# Patient Record
Sex: FEMALE | Race: OTHER | Employment: PART TIME | URBAN - METROPOLITAN AREA
[De-identification: names, ages, dates, MRNs, and addresses within clinical notes are randomized per-mention and may not be internally consistent; named-entity substitution may affect disease eponyms.]

---

## 2017-01-12 ENCOUNTER — OFFICE VISIT (OUTPATIENT)
Dept: FAMILY MEDICINE CLINIC | Facility: CLINIC | Age: 62
End: 2017-01-12

## 2017-01-12 VITALS
SYSTOLIC BLOOD PRESSURE: 118 MMHG | HEART RATE: 92 BPM | DIASTOLIC BLOOD PRESSURE: 78 MMHG | BODY MASS INDEX: 26.5 KG/M2 | WEIGHT: 135 LBS | TEMPERATURE: 100 F | HEIGHT: 60 IN | RESPIRATION RATE: 16 BRPM

## 2017-01-12 DIAGNOSIS — R68.89 FLU-LIKE SYMPTOMS: Primary | ICD-10-CM

## 2017-01-12 PROCEDURE — 99213 OFFICE O/P EST LOW 20 MIN: CPT | Performed by: FAMILY MEDICINE

## 2017-01-12 RX ORDER — OSELTAMIVIR PHOSPHATE 75 MG/1
75 CAPSULE ORAL 2 TIMES DAILY
Qty: 10 CAPSULE | Refills: 0 | Status: SHIPPED | OUTPATIENT
Start: 2017-01-12 | End: 2017-02-06 | Stop reason: ALTCHOICE

## 2017-01-12 NOTE — PROGRESS NOTES
Chief Complaint:  Patient presents with:  Fatigue: Fatigue for 2days. Headache: Headache above eyes for 2 days. Runny nose. Took Advil with some relief. Pt had been advised not to take Advil. Fever: Temperature of 103 yesterday.     HPI:  This is a 62 year Ophthalmic Solution 1 drop 2 (two) times daily. Disp:  Rfl:    latanoprost 0.005 % Ophthalmic Solution nightly.  Disp:  Rfl:    Levothyroxine Sodium 88 MCG Oral Tab Take 1 tablet (88 mcg total) by mouth before breakfast. Disp: 30 tablet Rfl: 5   Atorvastati hours of symptoms, will treat with tamiflu. Discussed symptomatic management. -     Oseltamivir Phosphate 75 MG Oral Cap; Take 1 capsule (75 mg total) by mouth 2 (two) times daily.     Concerning signs and symptoms that warrant returning to the clinic rev

## 2017-01-23 DIAGNOSIS — E78.00 PURE HYPERCHOLESTEROLEMIA: Primary | ICD-10-CM

## 2017-01-23 RX ORDER — ATORVASTATIN CALCIUM 10 MG/1
TABLET, FILM COATED ORAL
Qty: 30 TABLET | Refills: 0 | Status: SHIPPED | OUTPATIENT
Start: 2017-01-23 | End: 2017-02-06

## 2017-01-24 ENCOUNTER — OFFICE VISIT (OUTPATIENT)
Dept: INTERNAL MEDICINE CLINIC | Facility: CLINIC | Age: 62
End: 2017-01-24

## 2017-01-24 VITALS
RESPIRATION RATE: 16 BRPM | DIASTOLIC BLOOD PRESSURE: 60 MMHG | BODY MASS INDEX: 27.21 KG/M2 | HEIGHT: 59 IN | WEIGHT: 135 LBS | HEART RATE: 78 BPM | SYSTOLIC BLOOD PRESSURE: 122 MMHG

## 2017-01-24 DIAGNOSIS — Z51.81 ENCOUNTER FOR THERAPEUTIC DRUG MONITORING: Primary | ICD-10-CM

## 2017-01-24 DIAGNOSIS — F43.9 STRESS: ICD-10-CM

## 2017-01-24 DIAGNOSIS — E66.9 OBESITY (BMI 30.0-34.9): ICD-10-CM

## 2017-01-24 PROCEDURE — 99213 OFFICE O/P EST LOW 20 MIN: CPT | Performed by: INTERNAL MEDICINE

## 2017-01-24 NOTE — PROGRESS NOTES
CC: Patient presents with:  Weight Check: up 2 lb       HPI:   Obesity, doing well on belviq and wellbutrin. No vision issues. Stress level is ok.        Current Outpatient Prescriptions:  Lorcaserin HCl (BELVIQ) 10 MG Oral Tab Take 10 mg by mouth 2 (tw and in no apparent distress, A/O x3  HEENT:  PERRLA  LUNGS: clear to auscultation bilat   CARDIO: RRR without murmur    No orders of the defined types were placed in this encounter.         Signed Prescriptions Disp Refills    Lorcaserin HCl (BELVIQ) 10 MG

## 2017-01-24 NOTE — TELEPHONE ENCOUNTER
Last physical was 1/26/16. Future Appointments  Date Time Provider Nils Guzman   1/24/2017 1:30 PM Mely Oviedo MD [facility]     Medication refilled per protocol.   Future Appointments  Date Time Provider Nils Guzman   1/24/2017 1:30

## 2017-01-30 ENCOUNTER — TELEPHONE (OUTPATIENT)
Dept: FAMILY MEDICINE CLINIC | Facility: CLINIC | Age: 62
End: 2017-01-30

## 2017-01-30 DIAGNOSIS — Z13.0 SCREENING FOR DEFICIENCY ANEMIA: ICD-10-CM

## 2017-01-30 DIAGNOSIS — Z13.228 SCREENING FOR METABOLIC DISORDER: ICD-10-CM

## 2017-01-30 DIAGNOSIS — E03.9 ACQUIRED HYPOTHYROIDISM: ICD-10-CM

## 2017-01-30 DIAGNOSIS — Z00.00 ROUTINE GENERAL MEDICAL EXAMINATION AT A HEALTH CARE FACILITY: Primary | ICD-10-CM

## 2017-01-30 DIAGNOSIS — E78.00 PURE HYPERCHOLESTEROLEMIA: ICD-10-CM

## 2017-01-30 NOTE — TELEPHONE ENCOUNTER
MELANY garrett/yoel on 2/6/17. Sanchobinh Fredrick no changes in health and also has an appt here for her labs on 2/1/17. Please place labs

## 2017-01-31 ENCOUNTER — TELEPHONE (OUTPATIENT)
Dept: FAMILY MEDICINE CLINIC | Facility: CLINIC | Age: 62
End: 2017-01-31

## 2017-02-01 ENCOUNTER — LAB ENCOUNTER (OUTPATIENT)
Dept: LAB | Age: 62
End: 2017-02-01
Attending: FAMILY MEDICINE
Payer: MEDICAID

## 2017-02-01 DIAGNOSIS — E78.00 PURE HYPERCHOLESTEROLEMIA: ICD-10-CM

## 2017-02-01 DIAGNOSIS — Z00.00 ROUTINE GENERAL MEDICAL EXAMINATION AT A HEALTH CARE FACILITY: ICD-10-CM

## 2017-02-01 DIAGNOSIS — Z13.0 SCREENING FOR DEFICIENCY ANEMIA: ICD-10-CM

## 2017-02-01 DIAGNOSIS — Z13.228 SCREENING FOR METABOLIC DISORDER: ICD-10-CM

## 2017-02-01 DIAGNOSIS — E03.9 ACQUIRED HYPOTHYROIDISM: ICD-10-CM

## 2017-02-01 LAB
25-HYDROXYVITAMIN D (TOTAL): 25.7 NG/ML (ref 30–100)
ALBUMIN SERPL-MCNC: 3.5 G/DL (ref 3.5–4.8)
ALP LIVER SERPL-CCNC: 81 U/L (ref 46–118)
ALT SERPL-CCNC: 29 U/L (ref 14–54)
AST SERPL-CCNC: 15 U/L (ref 15–41)
BASOPHILS # BLD AUTO: 0.01 X10(3) UL (ref 0–0.1)
BASOPHILS NFR BLD AUTO: 0.3 %
BILIRUB SERPL-MCNC: 0.6 MG/DL (ref 0.1–2)
BUN BLD-MCNC: 16 MG/DL (ref 8–20)
CALCIUM BLD-MCNC: 8.9 MG/DL (ref 8.3–10.3)
CHLORIDE: 108 MMOL/L (ref 101–111)
CHOLEST SMN-MCNC: 192 MG/DL (ref ?–200)
CO2: 27 MMOL/L (ref 22–32)
CREAT BLD-MCNC: 1.01 MG/DL (ref 0.55–1.02)
EOSINOPHIL # BLD AUTO: 0.04 X10(3) UL (ref 0–0.3)
EOSINOPHIL NFR BLD AUTO: 1.1 %
ERYTHROCYTE [DISTWIDTH] IN BLOOD BY AUTOMATED COUNT: 14.6 % (ref 11.5–16)
FREE T4: 1 NG/DL (ref 0.9–1.8)
GLUCOSE BLD-MCNC: 82 MG/DL (ref 70–99)
HCT VFR BLD AUTO: 42.4 % (ref 34–50)
HDLC SERPL-MCNC: 85 MG/DL (ref 45–?)
HDLC SERPL: 2.26 {RATIO} (ref ?–4.44)
HGB BLD-MCNC: 13.8 G/DL (ref 12–16)
IMMATURE GRANULOCYTE COUNT: 0 X10(3) UL (ref 0–1)
IMMATURE GRANULOCYTE RATIO %: 0 %
LDLC SERPL CALC-MCNC: 94 MG/DL (ref ?–130)
LYMPHOCYTES # BLD AUTO: 2.08 X10(3) UL (ref 0.9–4)
LYMPHOCYTES NFR BLD AUTO: 57 %
M PROTEIN MFR SERPL ELPH: 7.3 G/DL (ref 6.1–8.3)
MCH RBC QN AUTO: 30.9 PG (ref 27–33.2)
MCHC RBC AUTO-ENTMCNC: 32.5 G/DL (ref 31–37)
MCV RBC AUTO: 94.9 FL (ref 81–100)
MONOCYTES # BLD AUTO: 0.39 X10(3) UL (ref 0.1–0.6)
MONOCYTES NFR BLD AUTO: 10.7 %
NEUTROPHIL ABS PRELIM: 1.13 X10 (3) UL (ref 1.3–6.7)
NEUTROPHILS # BLD AUTO: 1.13 X10(3) UL (ref 1.3–6.7)
NEUTROPHILS NFR BLD AUTO: 30.9 %
NONHDLC SERPL-MCNC: 107 MG/DL (ref ?–130)
PLATELET # BLD AUTO: 245 10(3)UL (ref 150–450)
POTASSIUM SERPL-SCNC: 3.8 MMOL/L (ref 3.6–5.1)
RBC # BLD AUTO: 4.47 X10(6)UL (ref 3.8–5.1)
RED CELL DISTRIBUTION WIDTH-SD: 50.8 FL (ref 35.1–46.3)
SODIUM SERPL-SCNC: 141 MMOL/L (ref 136–144)
TRIGLYCERIDES: 67 MG/DL (ref ?–150)
TSI SER-ACNC: 1.24 MIU/ML (ref 0.35–5.5)
VLDL: 13 MG/DL (ref 5–40)
WBC # BLD AUTO: 3.7 X10(3) UL (ref 4–13)

## 2017-02-01 PROCEDURE — 85025 COMPLETE CBC W/AUTO DIFF WBC: CPT

## 2017-02-01 PROCEDURE — 80053 COMPREHEN METABOLIC PANEL: CPT

## 2017-02-01 PROCEDURE — 36415 COLL VENOUS BLD VENIPUNCTURE: CPT

## 2017-02-01 PROCEDURE — 84439 ASSAY OF FREE THYROXINE: CPT

## 2017-02-01 PROCEDURE — 80061 LIPID PANEL: CPT

## 2017-02-01 PROCEDURE — 84443 ASSAY THYROID STIM HORMONE: CPT

## 2017-02-01 PROCEDURE — 82306 VITAMIN D 25 HYDROXY: CPT

## 2017-02-01 NOTE — TELEPHONE ENCOUNTER
Called pt and left msg, stating that belviq is not covered under her pharmacy benefit plan, and to use the coupon that was provided, if she did not receive to have her call the office.

## 2017-02-06 ENCOUNTER — OFFICE VISIT (OUTPATIENT)
Dept: FAMILY MEDICINE CLINIC | Facility: CLINIC | Age: 62
End: 2017-02-06

## 2017-02-06 VITALS
HEART RATE: 72 BPM | HEIGHT: 60.25 IN | RESPIRATION RATE: 12 BRPM | SYSTOLIC BLOOD PRESSURE: 130 MMHG | WEIGHT: 141 LBS | BODY MASS INDEX: 27.32 KG/M2 | DIASTOLIC BLOOD PRESSURE: 72 MMHG

## 2017-02-06 DIAGNOSIS — Z12.31 ENCOUNTER FOR SCREENING MAMMOGRAM FOR BREAST CANCER: ICD-10-CM

## 2017-02-06 DIAGNOSIS — E78.00 PURE HYPERCHOLESTEROLEMIA: ICD-10-CM

## 2017-02-06 DIAGNOSIS — Z01.419 WELL WOMAN EXAM: Primary | ICD-10-CM

## 2017-02-06 DIAGNOSIS — E03.9 ACQUIRED HYPOTHYROIDISM: ICD-10-CM

## 2017-02-06 DIAGNOSIS — E04.2 MULTIPLE THYROID NODULES: ICD-10-CM

## 2017-02-06 PROCEDURE — 99396 PREV VISIT EST AGE 40-64: CPT | Performed by: FAMILY MEDICINE

## 2017-02-06 RX ORDER — ATORVASTATIN CALCIUM 10 MG/1
TABLET, FILM COATED ORAL
Qty: 90 TABLET | Refills: 3 | Status: SHIPPED | OUTPATIENT
Start: 2017-02-06 | End: 2018-02-09

## 2017-02-06 RX ORDER — LEVOTHYROXINE SODIUM 88 UG/1
88 TABLET ORAL
Qty: 90 TABLET | Refills: 3 | Status: SHIPPED | OUTPATIENT
Start: 2017-02-06 | End: 2018-02-09

## 2017-02-06 NOTE — PROGRESS NOTES
:HPI:   Robert Morel is a 65year old female who presents for a complete physical exam.     Patient complains of fatigue. Working with the elderly.   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 Ultrasound of thyroid is ordered. Further plans pending the outcome of this. Patient does not think she can see Dr. Maddie Saleem anymore due to her insurance. Encouraged her to get back to her regular aerobic activity.     Se

## 2017-02-15 ENCOUNTER — HOSPITAL ENCOUNTER (OUTPATIENT)
Dept: MAMMOGRAPHY | Age: 62
Discharge: HOME | End: 2017-02-15
Attending: FAMILY MEDICINE
Payer: MEDICAID

## 2017-02-15 ENCOUNTER — HOSPITAL ENCOUNTER (OUTPATIENT)
Dept: ULTRASOUND IMAGING | Age: 62
Discharge: HOME | End: 2017-02-15
Attending: FAMILY MEDICINE
Payer: MEDICAID

## 2017-02-15 DIAGNOSIS — Z12.31 ENCOUNTER FOR SCREENING MAMMOGRAM FOR BREAST CANCER: ICD-10-CM

## 2017-02-15 DIAGNOSIS — E04.2 MULTIPLE THYROID NODULES: ICD-10-CM

## 2017-02-15 PROCEDURE — 77067 SCR MAMMO BI INCL CAD: CPT

## 2017-02-15 PROCEDURE — 76536 US EXAM OF HEAD AND NECK: CPT

## 2017-03-01 ENCOUNTER — TELEPHONE (OUTPATIENT)
Dept: FAMILY MEDICINE CLINIC | Facility: CLINIC | Age: 62
End: 2017-03-01

## 2017-03-01 DIAGNOSIS — E04.2 MULTIPLE THYROID NODULES: Primary | ICD-10-CM

## 2017-03-01 NOTE — TELEPHONE ENCOUNTER
Pt states that she called Medicaid and they had the names of two ENT's that she could see. When she called, one is no longer practicing and the other is in [facility]. Offered to enter referral to ENT in [facility].  Pt states that she does not want to go to [facility]

## 2017-03-01 NOTE — TELEPHONE ENCOUNTER
Patient needs recommendations to an ears, nose, and eye specialist due to her having nodules on her thyroid.

## 2017-03-01 NOTE — TELEPHONE ENCOUNTER
Pt returned call with [facility] ENT Dr. Jose Rafael [facility]

## 2017-03-02 ENCOUNTER — TELEPHONE (OUTPATIENT)
Dept: INTERNAL MEDICINE CLINIC | Facility: CLINIC | Age: 62
End: 2017-03-02

## 2017-03-14 ENCOUNTER — OFFICE VISIT (OUTPATIENT)
Dept: OTOLARYNGOLOGY | Facility: CLINIC | Age: 62
End: 2017-03-14

## 2017-03-14 VITALS
DIASTOLIC BLOOD PRESSURE: 60 MMHG | WEIGHT: 136 LBS | SYSTOLIC BLOOD PRESSURE: 112 MMHG | BODY MASS INDEX: 27.42 KG/M2 | HEIGHT: 59 IN | TEMPERATURE: 98 F

## 2017-03-14 DIAGNOSIS — J34.89 NASAL SEPTAL PERFORATION: Primary | ICD-10-CM

## 2017-03-14 DIAGNOSIS — E04.2 MULTINODULAR NON-TOXIC GOITER: ICD-10-CM

## 2017-03-14 PROCEDURE — 99212 OFFICE O/P EST SF 10 MIN: CPT | Performed by: OTOLARYNGOLOGY

## 2017-03-14 PROCEDURE — 99243 OFF/OP CNSLTJ NEW/EST LOW 30: CPT | Performed by: OTOLARYNGOLOGY

## 2017-03-14 RX ORDER — BROMPHENIRAMIN/PSEUDOEPHEDRINE 12MG-120MG
1 CAPSULE, EXTENDED RELEASE ORAL 2 TIMES DAILY
COMMUNITY
Start: 2017-03-01 | End: 2019-12-16

## 2017-03-14 NOTE — PROGRESS NOTES
Minnie Simmonds is a 65year old female. Patient presents with:  Thyroid Problem: thyroid ultrasound done on 2/15/17      HISTORY OF PRESENT ILLNESS    She presents with a history of hypothyroidism currently on levothyroxine with stable TSH levels.  She oliver Negative Dyspnea and wheezing. Cardio Negative Chest pain, irregular heartbeat/palpitations and syncope. GI Negative Abdominal pain and diarrhea. Endocrine Negative Cold intolerance and heat intolerance. Neuro Negative Tremors.    Psych Negative Anx TIME A DAY, Disp: 90 tablet, Rfl: 3  •  Levothyroxine Sodium 88 MCG Oral Tab, Take 1 tablet (88 mcg total) by mouth before breakfast., Disp: 90 tablet, Rfl: 3  •  Lorcaserin HCl (BELVIQ) 10 MG Oral Tab, Take 10 mg by mouth 2 (two) times daily. , Disp: 60 ta

## 2017-03-20 ENCOUNTER — TELEPHONE (OUTPATIENT)
Dept: OTOLARYNGOLOGY | Facility: CLINIC | Age: 62
End: 2017-03-20

## 2017-03-20 NOTE — TELEPHONE ENCOUNTER
Message  Received: 6 days ago       Negrito Leggett MD  [facility] Clinical Staff                     Repeat ultrasound of the thyroid in one year.  Place her name in our thyroid book.

## 2017-04-25 ENCOUNTER — OFFICE VISIT (OUTPATIENT)
Dept: INTERNAL MEDICINE CLINIC | Facility: CLINIC | Age: 62
End: 2017-04-25

## 2017-04-25 VITALS
BODY MASS INDEX: 26.41 KG/M2 | SYSTOLIC BLOOD PRESSURE: 118 MMHG | WEIGHT: 131 LBS | HEART RATE: 68 BPM | DIASTOLIC BLOOD PRESSURE: 70 MMHG | HEIGHT: 59 IN | RESPIRATION RATE: 16 BRPM

## 2017-04-25 DIAGNOSIS — F43.9 STRESS: ICD-10-CM

## 2017-04-25 DIAGNOSIS — Z51.81 ENCOUNTER FOR THERAPEUTIC DRUG MONITORING: Primary | ICD-10-CM

## 2017-04-25 DIAGNOSIS — E66.9 OBESITY (BMI 30.0-34.9): ICD-10-CM

## 2017-04-25 PROCEDURE — 99213 OFFICE O/P EST LOW 20 MIN: CPT | Performed by: INTERNAL MEDICINE

## 2017-04-25 RX ORDER — BUPROPION HYDROCHLORIDE 300 MG/1
300 TABLET ORAL DAILY
Qty: 30 TABLET | Refills: 5 | Status: SHIPPED | OUTPATIENT
Start: 2017-04-25 | End: 2017-09-08

## 2017-04-25 NOTE — PROGRESS NOTES
CC: Patient presents with:  Weight Check: down 4 lb       HPI:   Obesity, doing well on belviq and wellbutrin. No vision problems. Some increased stress taking care of  and parents.        Current Outpatient Prescriptions:  Lorcaserin HCl (BELV PERRLA  LUNGS: clear to auscultation bilat   CARDIO: RRR without murmur    No orders of the defined types were placed in this encounter.         Signed Prescriptions Disp Refills    Lorcaserin HCl (BELVIQ) 10 MG Oral Tab 60 tablet 2      Sig: Take 10 mg by

## 2017-08-08 ENCOUNTER — OFFICE VISIT (OUTPATIENT)
Dept: INTERNAL MEDICINE CLINIC | Facility: CLINIC | Age: 62
End: 2017-08-08

## 2017-08-08 VITALS
DIASTOLIC BLOOD PRESSURE: 78 MMHG | RESPIRATION RATE: 16 BRPM | SYSTOLIC BLOOD PRESSURE: 120 MMHG | WEIGHT: 128 LBS | HEIGHT: 59 IN | BODY MASS INDEX: 25.8 KG/M2 | HEART RATE: 76 BPM

## 2017-08-08 DIAGNOSIS — F43.9 STRESS: ICD-10-CM

## 2017-08-08 DIAGNOSIS — E66.9 OBESITY (BMI 30.0-34.9): ICD-10-CM

## 2017-08-08 DIAGNOSIS — Z51.81 ENCOUNTER FOR THERAPEUTIC DRUG MONITORING: Primary | ICD-10-CM

## 2017-08-08 PROCEDURE — 99213 OFFICE O/P EST LOW 20 MIN: CPT | Performed by: INTERNAL MEDICINE

## 2017-08-08 NOTE — PROGRESS NOTES
CC: Patient presents with:  Weight Check: down 3 lb       HPI:   Obesity, doing well on belviq and wellbutrin. Working out well. Stress level is ok. No vision problems.        Current Outpatient Prescriptions:  Lorcaserin HCl (BELVIQ) 10 MG Oral Tab T PERRLA  LUNGS: clear to auscultation bilat   CARDIO: RRR without murmur      Orders Placed This Encounter      Vitamin B12      VITAMIN D, 25-HYDROXY     Signed Prescriptions Disp Refills    Lorcaserin HCl (BELVIQ) 10 MG Oral Tab 60 tablet 2      Sig: Take

## 2017-08-10 ENCOUNTER — LAB ENCOUNTER (OUTPATIENT)
Dept: LAB | Age: 62
End: 2017-08-10
Attending: INTERNAL MEDICINE
Payer: COMMERCIAL

## 2017-08-10 DIAGNOSIS — Z51.81 ENCOUNTER FOR THERAPEUTIC DRUG MONITORING: ICD-10-CM

## 2017-08-10 DIAGNOSIS — E66.9 OBESITY (BMI 30.0-34.9): ICD-10-CM

## 2017-08-10 LAB
25-HYDROXYVITAMIN D (TOTAL): 33.3 NG/ML (ref 30–100)
HAV AB SERPL IA-ACNC: 1082 PG/ML (ref 193–986)

## 2017-08-10 PROCEDURE — 36415 COLL VENOUS BLD VENIPUNCTURE: CPT | Performed by: INTERNAL MEDICINE

## 2017-08-10 PROCEDURE — 82306 VITAMIN D 25 HYDROXY: CPT | Performed by: INTERNAL MEDICINE

## 2017-08-10 PROCEDURE — 82607 VITAMIN B-12: CPT | Performed by: INTERNAL MEDICINE

## 2017-08-24 ENCOUNTER — TELEPHONE (OUTPATIENT)
Dept: INTERNAL MEDICINE CLINIC | Facility: CLINIC | Age: 62
End: 2017-08-24

## 2017-09-08 ENCOUNTER — OFFICE VISIT (OUTPATIENT)
Dept: INTERNAL MEDICINE CLINIC | Facility: CLINIC | Age: 62
End: 2017-09-08

## 2017-09-08 ENCOUNTER — TELEPHONE (OUTPATIENT)
Dept: INTERNAL MEDICINE CLINIC | Facility: CLINIC | Age: 62
End: 2017-09-08

## 2017-09-08 DIAGNOSIS — Z02.9 ENCOUNTERS FOR ADMINISTRATIVE PURPOSE: Primary | ICD-10-CM

## 2017-09-08 RX ORDER — BUPROPION HYDROCHLORIDE 300 MG/1
300 TABLET ORAL DAILY
Qty: 30 TABLET | Refills: 5 | Status: CANCELLED | OUTPATIENT
Start: 2017-09-08

## 2017-09-08 RX ORDER — BUPROPION HYDROCHLORIDE 300 MG/1
300 TABLET ORAL DAILY
Qty: 30 TABLET | Refills: 5 | Status: SHIPPED | OUTPATIENT
Start: 2017-09-08 | End: 2018-01-26

## 2017-09-14 ENCOUNTER — TELEPHONE (OUTPATIENT)
Dept: INTERNAL MEDICINE CLINIC | Facility: CLINIC | Age: 62
End: 2017-09-14

## 2017-09-14 ENCOUNTER — TELEPHONE (OUTPATIENT)
Dept: FAMILY MEDICINE CLINIC | Facility: CLINIC | Age: 62
End: 2017-09-14

## 2017-09-14 DIAGNOSIS — E78.00 PURE HYPERCHOLESTEROLEMIA: Primary | ICD-10-CM

## 2017-09-15 ENCOUNTER — APPOINTMENT (OUTPATIENT)
Dept: LAB | Age: 62
End: 2017-09-15
Attending: FAMILY MEDICINE
Payer: COMMERCIAL

## 2017-09-15 DIAGNOSIS — E78.00 PURE HYPERCHOLESTEROLEMIA: ICD-10-CM

## 2017-09-15 LAB
ALBUMIN SERPL-MCNC: 3.7 G/DL (ref 3.5–4.8)
ALP LIVER SERPL-CCNC: 85 U/L (ref 50–130)
ALT SERPL-CCNC: 42 U/L (ref 14–54)
AST SERPL-CCNC: 26 U/L (ref 15–41)
BILIRUB SERPL-MCNC: 0.5 MG/DL (ref 0.1–2)
BUN BLD-MCNC: 20 MG/DL (ref 8–20)
CALCIUM BLD-MCNC: 8.6 MG/DL (ref 8.3–10.3)
CHLORIDE: 108 MMOL/L (ref 101–111)
CHOLEST SMN-MCNC: 186 MG/DL (ref ?–200)
CO2: 28 MMOL/L (ref 22–32)
CREAT BLD-MCNC: 1.14 MG/DL (ref 0.55–1.02)
GLUCOSE BLD-MCNC: 86 MG/DL (ref 70–99)
HDLC SERPL-MCNC: 90 MG/DL (ref 45–?)
HDLC SERPL: 2.07 {RATIO} (ref ?–4.44)
LDLC SERPL CALC-MCNC: 86 MG/DL (ref ?–130)
LDLC SERPL-MCNC: 10 MG/DL (ref 5–40)
M PROTEIN MFR SERPL ELPH: 7.7 G/DL (ref 6.1–8.3)
NONHDLC SERPL-MCNC: 96 MG/DL (ref ?–130)
POTASSIUM SERPL-SCNC: 4.3 MMOL/L (ref 3.6–5.1)
SODIUM SERPL-SCNC: 141 MMOL/L (ref 136–144)
TRIGLYCERIDES: 51 MG/DL (ref ?–150)

## 2017-09-15 PROCEDURE — 36415 COLL VENOUS BLD VENIPUNCTURE: CPT | Performed by: FAMILY MEDICINE

## 2017-09-15 PROCEDURE — 80061 LIPID PANEL: CPT | Performed by: FAMILY MEDICINE

## 2017-09-15 PROCEDURE — 80053 COMPREHEN METABOLIC PANEL: CPT | Performed by: FAMILY MEDICINE

## 2017-09-15 PROCEDURE — 84443 ASSAY THYROID STIM HORMONE: CPT | Performed by: FAMILY MEDICINE

## 2017-09-18 ENCOUNTER — OFFICE VISIT (OUTPATIENT)
Dept: FAMILY MEDICINE CLINIC | Facility: CLINIC | Age: 62
End: 2017-09-18

## 2017-09-18 VITALS
BODY MASS INDEX: 26 KG/M2 | DIASTOLIC BLOOD PRESSURE: 70 MMHG | WEIGHT: 130.63 LBS | HEART RATE: 60 BPM | SYSTOLIC BLOOD PRESSURE: 120 MMHG

## 2017-09-18 DIAGNOSIS — E78.00 PURE HYPERCHOLESTEROLEMIA: ICD-10-CM

## 2017-09-18 DIAGNOSIS — H61.23 BILATERAL HEARING LOSS DUE TO CERUMEN IMPACTION: ICD-10-CM

## 2017-09-18 DIAGNOSIS — R79.89 ELEVATED SERUM CREATININE: ICD-10-CM

## 2017-09-18 DIAGNOSIS — E03.9 ACQUIRED HYPOTHYROIDISM: Primary | ICD-10-CM

## 2017-09-18 PROCEDURE — 99214 OFFICE O/P EST MOD 30 MIN: CPT | Performed by: FAMILY MEDICINE

## 2017-09-18 PROCEDURE — 69210 REMOVE IMPACTED EAR WAX UNI: CPT | Performed by: FAMILY MEDICINE

## 2017-09-18 RX ORDER — OMEGA-3 FATTY ACIDS/FISH OIL 300-1000MG
200 CAPSULE ORAL
COMMUNITY
End: 2018-01-26

## 2017-09-18 RX ORDER — MULTIVIT-MIN/FA/LYCOPEN/LUTEIN .4-300-25
TABLET ORAL DAILY
COMMUNITY
End: 2021-06-07

## 2017-09-18 NOTE — PROGRESS NOTES
Luciano Ellis is a 65year old female. S:  Patient presents today with the following concerns:  · Lost 37 lbs on Belviq and Wellbutrin. Sees Dr. Darci Le monthly. Happy with results. Would like to lose 2-3 more lbs.   · Walking, housework, pilates for Dysthymia    Family History   Problem Relation Age of Onset   • Diabetes Father    • High Cholesterol Mother    • Hypertension Mother        REVIEW OF SYSTEMS:  GENERAL: feels well otherwise  SKIN: denies any unusual skin lesions  EYES:denies vision change treatment plan.

## 2017-09-19 LAB — TSI SER-ACNC: 0.94 MIU/ML (ref 0.35–5.5)

## 2017-09-20 DIAGNOSIS — E03.9 ACQUIRED HYPOTHYROIDISM: Primary | ICD-10-CM

## 2017-09-22 ENCOUNTER — TELEPHONE (OUTPATIENT)
Dept: FAMILY MEDICINE CLINIC | Facility: CLINIC | Age: 62
End: 2017-09-22

## 2017-09-22 NOTE — TELEPHONE ENCOUNTER
Spoke to pt who informed me she will not [facility] for 12/18/17 apptment with [facility]. Pt is in the process of getting an insurance we accept and would like to keep this apptment.

## 2017-09-26 DIAGNOSIS — E66.9 OBESITY (BMI 30.0-34.9): ICD-10-CM

## 2017-09-26 DIAGNOSIS — Z51.81 ENCOUNTER FOR THERAPEUTIC DRUG MONITORING: ICD-10-CM

## 2017-09-27 NOTE — TELEPHONE ENCOUNTER
Requesting Lorcaserin HCl (BELVIQ) 10 MG Oral Tab  LOV: 4/25/17  PLAN:  1. Obesity (BMI 30.0-34.9)/stress, pt with 4 lb wt loss in past 3 months of wellbutrin and belviq. Pt with total wt loss of 33 lbs.  Stress level is high, will set up with psych therapy

## 2017-09-28 NOTE — TELEPHONE ENCOUNTER
Called patient left vm, advise to bring in new ins card and will need to be verified before seeing the provider

## 2017-10-05 ENCOUNTER — TELEPHONE (OUTPATIENT)
Dept: INTERNAL MEDICINE CLINIC | Facility: CLINIC | Age: 62
End: 2017-10-05

## 2017-10-05 NOTE — TELEPHONE ENCOUNTER
In our system, it still shows the patient has [facility]. needs changed/updated prior to a script being given.  Patient was notified

## 2017-10-05 NOTE — TELEPHONE ENCOUNTER
Request for belviq from pharmacy.  Faxing request back to pharm informing that patient is no longer under our care per insurance reasons therefore cannot refill this controlled substance

## 2017-10-20 DIAGNOSIS — Z51.81 ENCOUNTER FOR THERAPEUTIC DRUG MONITORING: ICD-10-CM

## 2017-10-20 DIAGNOSIS — E66.9 OBESITY (BMI 30.0-34.9): ICD-10-CM

## 2017-10-24 ENCOUNTER — TELEPHONE (OUTPATIENT)
Dept: FAMILY MEDICINE CLINIC | Facility: CLINIC | Age: 62
End: 2017-10-24

## 2017-10-24 NOTE — TELEPHONE ENCOUNTER
Patient was transferred to me to discuss her need for authorization for belviq.   Her last filled was 8/2017 #60 with 2 additional.  I explained to patient that she would need to see another provider for this since it is not usually done outside an office v

## 2018-01-24 ENCOUNTER — TELEPHONE (OUTPATIENT)
Dept: FAMILY MEDICINE CLINIC | Facility: CLINIC | Age: 63
End: 2018-01-24

## 2018-01-24 NOTE — TELEPHONE ENCOUNTER
Fax received from [facility] requesting a refill of Bupropion. This was prescribed in the past by Dr Michelle Colon, formerly with the [facility]. Routed request to [facility].

## 2018-02-09 DIAGNOSIS — E78.00 PURE HYPERCHOLESTEROLEMIA: ICD-10-CM

## 2018-02-09 DIAGNOSIS — E03.9 ACQUIRED HYPOTHYROIDISM: ICD-10-CM

## 2018-02-12 RX ORDER — ATORVASTATIN CALCIUM 10 MG/1
TABLET, FILM COATED ORAL
Qty: 30 TABLET | Refills: 2 | Status: SHIPPED | OUTPATIENT
Start: 2018-02-12 | End: 2018-04-30

## 2018-02-12 RX ORDER — LEVOTHYROXINE SODIUM 88 UG/1
TABLET ORAL
Qty: 30 TABLET | Refills: 2 | Status: SHIPPED | OUTPATIENT
Start: 2018-02-12 | End: 2018-04-19

## 2018-02-13 DIAGNOSIS — E78.00 PURE HYPERCHOLESTEROLEMIA: ICD-10-CM

## 2018-02-13 NOTE — TELEPHONE ENCOUNTER
Pharmacy sent refill request to office. But, Pt has [facility]. Please call Pt and find out of she has new provider or new insurance. Routed to [facility].

## 2018-02-13 NOTE — TELEPHONE ENCOUNTER
LMOM for pt to call back and confirm if she will still be our pt due to her having [facility]. . (received refill request)

## 2018-02-15 RX ORDER — ATORVASTATIN CALCIUM 10 MG/1
TABLET, FILM COATED ORAL
Qty: 30 TABLET | Refills: 6 | Status: SHIPPED | OUTPATIENT
Start: 2018-02-15 | End: 2018-03-16

## 2018-02-15 NOTE — TELEPHONE ENCOUNTER
Spoke to pt about her Medication and Insurance (Igor) and she states that she now has [facility] w/Medicaid [employer] updated) Can she get her medication?

## 2018-02-15 NOTE — TELEPHONE ENCOUNTER
Refilll request for Atorvastatin 10mg #30 with 6 ref.   LOV 9/17/17  Last CMP, Lipid 9/15/17  Refill per protocol sent to [facility]

## 2018-03-28 ENCOUNTER — TELEPHONE (OUTPATIENT)
Dept: OTOLARYNGOLOGY | Facility: CLINIC | Age: 63
End: 2018-03-28

## 2018-03-28 DIAGNOSIS — E04.2 NON-TOXIC MULTINODULAR GOITER: Primary | ICD-10-CM

## 2018-03-28 NOTE — TELEPHONE ENCOUNTER
LMTCB-Pt has [facility], she is due for repeat US of thyroid. If she still has [facility] pt can follow up with provider within there network.

## 2018-04-09 NOTE — TELEPHONE ENCOUNTER
Spoke w/ pt; she has switched coverage to [facility]. Transferred pt to [facility] to update insurance information. Pt informed our office will need to obtain auth for her [address] [place] [address] Avenue before she may schedule the appt.

## 2018-04-13 ENCOUNTER — TELEPHONE (OUTPATIENT)
Dept: OTOLARYNGOLOGY | Facility: CLINIC | Age: 63
End: 2018-04-13

## 2018-04-13 NOTE — TELEPHONE ENCOUNTER
LMTCB- please advise pt that prior auth is pending and pt may need to reschedule if not authorized by 04/17

## 2018-04-13 NOTE — TELEPHONE ENCOUNTER
PA needed for this [place]. Patient is scheduled for Tuesday 4/17/18. Checked [employer] & no PA has been created yet.

## 2018-04-16 NOTE — TELEPHONE ENCOUNTER
Pt informed prior Virl August is still pending and pt will have to cancel appointment for tomorrow. Pt advised to contact central scheduling.

## 2018-04-19 DIAGNOSIS — E03.9 ACQUIRED HYPOTHYROIDISM: ICD-10-CM

## 2018-04-19 RX ORDER — LEVOTHYROXINE SODIUM 88 UG/1
TABLET ORAL
Qty: 30 TABLET | Refills: 5 | Status: SHIPPED | OUTPATIENT
Start: 2018-04-19 | End: 2018-10-03

## 2018-04-19 NOTE — TELEPHONE ENCOUNTER
Pt informed auth for [address] [place] [address] has been rec'd and expires 5/28/18; she may contact CS to schedule. Pt verbalized understanding; call transferred to CS.

## 2018-04-20 ENCOUNTER — LAB ENCOUNTER (OUTPATIENT)
Dept: LAB | Age: 63
End: 2018-04-20
Attending: FAMILY MEDICINE
Payer: MEDICAID

## 2018-04-20 DIAGNOSIS — E66.9 OBESITY (BMI 30.0-34.9): ICD-10-CM

## 2018-04-20 DIAGNOSIS — Z51.81 ENCOUNTER FOR THERAPEUTIC DRUG MONITORING: ICD-10-CM

## 2018-04-20 DIAGNOSIS — R79.89 ELEVATED SERUM CREATININE: ICD-10-CM

## 2018-04-20 DIAGNOSIS — E03.9 ACQUIRED HYPOTHYROIDISM: ICD-10-CM

## 2018-04-20 PROCEDURE — 36415 COLL VENOUS BLD VENIPUNCTURE: CPT | Performed by: FAMILY MEDICINE

## 2018-04-20 PROCEDURE — 80053 COMPREHEN METABOLIC PANEL: CPT | Performed by: FAMILY MEDICINE

## 2018-04-20 PROCEDURE — 84439 ASSAY OF FREE THYROXINE: CPT | Performed by: FAMILY MEDICINE

## 2018-04-20 PROCEDURE — 84443 ASSAY THYROID STIM HORMONE: CPT | Performed by: FAMILY MEDICINE

## 2018-04-21 ENCOUNTER — HOSPITAL ENCOUNTER (OUTPATIENT)
Dept: ULTRASOUND IMAGING | Age: 63
Discharge: HOME | End: 2018-04-21
Attending: OTOLARYNGOLOGY
Payer: MEDICAID

## 2018-04-21 DIAGNOSIS — E04.2 NON-TOXIC MULTINODULAR GOITER: ICD-10-CM

## 2018-04-21 PROCEDURE — 76536 US EXAM OF HEAD AND NECK: CPT | Performed by: OTOLARYNGOLOGY

## 2018-04-30 ENCOUNTER — OFFICE VISIT (OUTPATIENT)
Dept: FAMILY MEDICINE CLINIC | Facility: CLINIC | Age: 63
End: 2018-04-30

## 2018-04-30 VITALS
BODY MASS INDEX: 27.64 KG/M2 | RESPIRATION RATE: 16 BRPM | WEIGHT: 137.13 LBS | TEMPERATURE: 98 F | HEART RATE: 60 BPM | HEIGHT: 59.25 IN | SYSTOLIC BLOOD PRESSURE: 120 MMHG | DIASTOLIC BLOOD PRESSURE: 80 MMHG

## 2018-04-30 DIAGNOSIS — E78.00 PURE HYPERCHOLESTEROLEMIA: ICD-10-CM

## 2018-04-30 DIAGNOSIS — R79.89 ELEVATED SERUM CREATININE: Primary | ICD-10-CM

## 2018-04-30 DIAGNOSIS — F43.0 STRESS REACTION: ICD-10-CM

## 2018-04-30 DIAGNOSIS — F34.1 DYSTHYMIA: ICD-10-CM

## 2018-04-30 PROCEDURE — 99214 OFFICE O/P EST MOD 30 MIN: CPT | Performed by: FAMILY MEDICINE

## 2018-04-30 RX ORDER — ATORVASTATIN CALCIUM 10 MG/1
10 TABLET, FILM COATED ORAL NIGHTLY
Qty: 90 TABLET | Refills: 3 | Status: SHIPPED | OUTPATIENT
Start: 2018-04-30 | End: 2019-03-25

## 2018-04-30 NOTE — PROGRESS NOTES
Latha Zuniga is a 59year old female. S:  Patient presents today with the following concerns:  · Stress-runs 2-3 days per week, piliates 2 days per week. Seeing Dr. Heather Durand with [facility] for weight loss.   · Stress reduction techniques including going to [facility] 30.0-34. 9)     Stress     Pure hypercholesterolemia     Acquired hypothyroidism     Dysthymia    Family History   Problem Relation Age of Onset   • Diabetes Father    • High Cholesterol Mother    • Hypertension Mother        REVIEW OF SYSTEMS:  GENERAL: fe enjoys, exercise. Follow up in 4 months for a well woman exam.  Patient verbalizes understanding of treatment plan. Spent over 25 mins with the patient and over 50% of this time is spent counseling her regarding stress reduction.

## 2018-05-01 ENCOUNTER — OFFICE VISIT (OUTPATIENT)
Dept: OTOLARYNGOLOGY | Facility: CLINIC | Age: 63
End: 2018-05-01

## 2018-05-01 VITALS
SYSTOLIC BLOOD PRESSURE: 122 MMHG | DIASTOLIC BLOOD PRESSURE: 78 MMHG | TEMPERATURE: 99 F | HEIGHT: 59 IN | WEIGHT: 137 LBS | BODY MASS INDEX: 27.62 KG/M2

## 2018-05-01 DIAGNOSIS — E04.2 NON-TOXIC MULTINODULAR GOITER: Primary | ICD-10-CM

## 2018-05-01 PROCEDURE — 99212 OFFICE O/P EST SF 10 MIN: CPT | Performed by: OTOLARYNGOLOGY

## 2018-05-01 PROCEDURE — 99214 OFFICE O/P EST MOD 30 MIN: CPT | Performed by: OTOLARYNGOLOGY

## 2018-05-01 RX ORDER — GARLIC EXTRACT 500 MG
1 CAPSULE ORAL DAILY
COMMUNITY
End: 2020-07-31

## 2018-05-01 NOTE — PROGRESS NOTES
Margie Oviedo is a 59year old female. Patient presents with:  Results: US thyroid done on 4-21      HISTORY OF PRESENT ILLNESS  She presents with a history of hypothyroidism currently on levothyroxine with stable TSH levels.  She does have a history of n Surgical History:  2002: BIOPSY OF THYROID,МАРИНА  1975: OTHER SURGICAL HISTORY      Comment: Labia, Laceration repair      REVIEW OF SYSTEMS    System Neg/Pos Details   Constitutional Negative Fatigue, fever and weight loss. ENMT Negative Drooling.    Ey Normal External nose - Normal. Lips/teeth/gums - Normal. Tonsils - Normal. Oropharynx - Normal.   Nose/Mouth/Throat Normal Nares - Right: Normal Left: Normal. Septum -Normal  Turbinates - Right: Normal, Left: Normal.       Current Outpatient Prescriptions:

## 2018-05-04 ENCOUNTER — OFFICE VISIT (OUTPATIENT)
Dept: OTOLARYNGOLOGY | Facility: CLINIC | Age: 63
End: 2018-05-04

## 2018-05-04 VITALS
WEIGHT: 137 LBS | HEIGHT: 59 IN | SYSTOLIC BLOOD PRESSURE: 132 MMHG | DIASTOLIC BLOOD PRESSURE: 86 MMHG | TEMPERATURE: 97 F | BODY MASS INDEX: 27.62 KG/M2

## 2018-05-04 DIAGNOSIS — J34.89 NASAL SEPTAL PERFORATION: Primary | ICD-10-CM

## 2018-05-04 PROCEDURE — 99213 OFFICE O/P EST LOW 20 MIN: CPT | Performed by: OTOLARYNGOLOGY

## 2018-05-04 PROCEDURE — 99212 OFFICE O/P EST SF 10 MIN: CPT | Performed by: OTOLARYNGOLOGY

## 2018-05-04 NOTE — PROGRESS NOTES
Tabby Tan is a 59year old female. Patient presents with:  Nose Problem    HPI:   She has been aware of a nasal septal perforation for many years. She is not sure as to why it started.   There has been no issues with bleeding recently but she does hav Smokeless tobacco: Never Used                      Alcohol use:  No               Comment: none       REVIEW OF SYSTEMS:   GENERAL HEALTH: feels well otherwise  GENERAL : denies fever, chills, sweats, weight loss, weight gain  SKIN: denie

## 2018-06-06 ENCOUNTER — TELEPHONE (OUTPATIENT)
Dept: FAMILY MEDICINE CLINIC | Facility: CLINIC | Age: 63
End: 2018-06-06

## 2018-06-06 NOTE — TELEPHONE ENCOUNTER
Called and talked to patient she has had a lot of stress in the last 2 days no appetite vomited today trying to eat some soup now has headache she is taking tylenol for this I will see if Ruby Lane PA-c will give her nausea meds or anxiety medication

## 2018-06-06 NOTE — TELEPHONE ENCOUNTER
Patient is under a lot of stress and anxiety right now and started throwing up this morning, patient has no real appetite, trying to force down soup, will be scheduling an appointment with a counselor but wants to know if there is something that Dr. Vargas Mis

## 2018-06-07 RX ORDER — ONDANSETRON 4 MG/1
4 TABLET, ORALLY DISINTEGRATING ORAL EVERY 8 HOURS PRN
Qty: 20 TABLET | Refills: 0 | Status: SHIPPED | OUTPATIENT
Start: 2018-06-07 | End: 2019-11-11

## 2018-06-07 NOTE — TELEPHONE ENCOUNTER
I sent over some Zofran. If this persists or anxiety is not improving, needs appt to discuss treatment options.

## 2018-07-26 ENCOUNTER — TELEPHONE (OUTPATIENT)
Dept: FAMILY MEDICINE CLINIC | Facility: CLINIC | Age: 63
End: 2018-07-26

## 2018-07-26 NOTE — TELEPHONE ENCOUNTER
Pt's  is suffering from dimensia and she wants to know if we have a name of a group she can go to for support.

## 2018-07-26 NOTE — TELEPHONE ENCOUNTER
Spoke with Pt. Info was found that she was looking for. It was in her  from Campbell Nieves chart from telephone call noted 05/16/18. Info given to Pt.

## 2018-07-26 NOTE — TELEPHONE ENCOUNTER
Pt is asking if there is any recommendations for a support group that you can give to her ? 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

## 2018-10-03 ENCOUNTER — OFFICE VISIT (OUTPATIENT)
Dept: FAMILY MEDICINE CLINIC | Facility: CLINIC | Age: 63
End: 2018-10-03
Payer: MEDICAID

## 2018-10-03 VITALS
DIASTOLIC BLOOD PRESSURE: 64 MMHG | RESPIRATION RATE: 16 BRPM | HEART RATE: 72 BPM | SYSTOLIC BLOOD PRESSURE: 114 MMHG | HEIGHT: 60 IN | BODY MASS INDEX: 26.97 KG/M2 | TEMPERATURE: 98 F | WEIGHT: 137.38 LBS

## 2018-10-03 DIAGNOSIS — Z12.11 SCREENING FOR COLON CANCER: ICD-10-CM

## 2018-10-03 DIAGNOSIS — E03.9 ACQUIRED HYPOTHYROIDISM: ICD-10-CM

## 2018-10-03 DIAGNOSIS — H61.21 HEARING LOSS SECONDARY TO CERUMEN IMPACTION, RIGHT: ICD-10-CM

## 2018-10-03 DIAGNOSIS — Z01.419 WELL WOMAN EXAM: Primary | ICD-10-CM

## 2018-10-03 DIAGNOSIS — Z12.39 SCREENING FOR BREAST CANCER: ICD-10-CM

## 2018-10-03 PROCEDURE — 69210 REMOVE IMPACTED EAR WAX UNI: CPT | Performed by: FAMILY MEDICINE

## 2018-10-03 PROCEDURE — 99396 PREV VISIT EST AGE 40-64: CPT | Performed by: FAMILY MEDICINE

## 2018-10-03 RX ORDER — BRIMONIDINE TARTRATE 0.1 %
1 DROPS OPHTHALMIC (EYE) 2 TIMES DAILY
Refills: 3 | COMMUNITY
Start: 2018-08-23 | End: 2019-12-16

## 2018-10-03 RX ORDER — LEVOTHYROXINE SODIUM 88 UG/1
TABLET ORAL
Qty: 30 TABLET | Refills: 5 | Status: SHIPPED | OUTPATIENT
Start: 2018-10-03 | End: 2019-01-24

## 2018-10-03 NOTE — PROGRESS NOTES
:HPI:   Margie Oviedo is a 59year old female who presents for a complete physical exam.     Patient complains of pain on the top of her head off and on. Right side. Lasts a few secs and goes away. No tenderness. This started a few months ago.   Does n daily. Disp:  Rfl:    prednisoLONE acetate 1 % Ophthalmic Suspension Place 1 drop into both eyes daily.    Disp:  Rfl:       Past Medical History:   Diagnosis Date   • Abdominal or pelvic swelling, mass or lump, unspecified site    • Cervicalgia    • Cystit removed through irrigation and with a curette. TM is normal.  After removal patient able to hear again. Left TM and ear canal are normal.  pharynx clear.   No oral lesions  EYES:PERRLA, EOMI,conjunctiva are clear  NECK: supple,no adenopathy, no thyromegal exam    PLAN:     Daniel Petty was seen today for well adult, follow up to mammogram, colon cancer screening and headache. Diagnoses and all orders for this visit:    Well woman exam    Screening for breast cancer  -     RASHEED SCREENING PEGGY (CPT=[id number]);  Carlos

## 2018-10-22 ENCOUNTER — TELEPHONE (OUTPATIENT)
Dept: FAMILY MEDICINE CLINIC | Facility: CLINIC | Age: 63
End: 2018-10-22

## 2018-10-22 DIAGNOSIS — Z12.11 ENCOUNTER FOR SCREENING COLONOSCOPY: Primary | ICD-10-CM

## 2018-10-22 NOTE — TELEPHONE ENCOUNTER
Patient went to schedule her colonoscopy and the gastro office told her that when she had her last one in 2005 she had diverticulitis and patient states that she was never informed of that and wants to speak with a nurse

## 2018-10-22 NOTE — TELEPHONE ENCOUNTER
Michael Lane PAC's response, but pt is still upset and wants to see another doctor. Referral entered for Dr Jordan Points and pt was given contact info.

## 2018-10-22 NOTE — TELEPHONE ENCOUNTER
This would not be something to be concerned about. Was likely diverticulosis which is very common. They may have mentioned to her after procedure but it is not unusual to not remember things that are told to one after anesthesia.     If she still would li

## 2018-10-22 NOTE — TELEPHONE ENCOUNTER
Pt is upset. She states that when she called Dr Malathi Valdez office to schedule her colonoscopy, she was told that her last colonoscopy showed diverticulitis. She states that she ws not told this . She wants to go to another GI. Referral pended.  Please

## 2018-10-26 ENCOUNTER — HOSPITAL ENCOUNTER (OUTPATIENT)
Dept: MAMMOGRAPHY | Age: 63
Discharge: HOME | End: 2018-10-26
Attending: FAMILY MEDICINE
Payer: MEDICAID

## 2018-10-26 DIAGNOSIS — Z12.39 SCREENING FOR BREAST CANCER: ICD-10-CM

## 2018-10-26 PROCEDURE — 77067 SCR MAMMO BI INCL CAD: CPT | Performed by: FAMILY MEDICINE

## 2018-10-26 PROCEDURE — 77063 BREAST TOMOSYNTHESIS BI: CPT | Performed by: FAMILY MEDICINE

## 2019-01-24 ENCOUNTER — TELEPHONE (OUTPATIENT)
Dept: FAMILY MEDICINE CLINIC | Facility: CLINIC | Age: 64
End: 2019-01-24

## 2019-01-24 DIAGNOSIS — E03.9 ACQUIRED HYPOTHYROIDISM: ICD-10-CM

## 2019-01-24 RX ORDER — LEVOTHYROXINE SODIUM 88 UG/1
TABLET ORAL
Qty: 30 TABLET | Refills: 5 | Status: SHIPPED | OUTPATIENT
Start: 2019-01-24 | End: 2019-08-17

## 2019-01-24 NOTE — TELEPHONE ENCOUNTER
Resent med to [facility] as requested told patient she might be out of pocket for this medication

## 2019-01-24 NOTE — TELEPHONE ENCOUNTER
Pt called back and she found a pharmacy that has the medication with the  that she needs.   [facility] phone number [phone]

## 2019-01-24 NOTE — TELEPHONE ENCOUNTER
Called patient and explained the sometimes pharmacies change manufactures due to price or availability so she is willing to call around and see if [employer] brand is at another pharmacy and then she will call for new order

## 2019-01-24 NOTE — TELEPHONE ENCOUNTER
Pt calling and her [facility] pharmacy has changed the  of her Levothyroxine Sodium 88 MCG Oral Tab and now the pill looks different and patient doesn't want to change the pill or the  she is concerned it may be different.   She would ev

## 2019-02-13 ENCOUNTER — APPOINTMENT (OUTPATIENT)
Dept: OTHER | Facility: HOSPITAL | Age: 64
End: 2019-02-13
Attending: PREVENTIVE MEDICINE

## 2019-03-11 NOTE — PROGRESS NOTES
Tabby Tan is a 62year old female. S:  Patient presents today with the following concerns:  · Headaches for 12 days on and off. Located on sides of head and radiates through center of head.   Occasional sharp pains in head-will even stop her \"in needed for Nausea. Disp: 20 tablet Rfl: 0   atorvastatin 10 MG Oral Tab Take 1 tablet (10 mg total) by mouth nightly. Disp: 90 tablet Rfl: 3   Bimatoprost (LUMIGAN) 0.01 % Ophthalmic Solution Place 1 drop into both eyes every evening.    Disp:  Rfl:    Mult lesions  HEENT: atraumatic, normocephalic,ears and throat are clear. No swelling over temples or tenderness. EYES:PERRLA, EOMI.   Injection of left eye  NECK: supple,no adenopathy,no bruits  LUNGS: CTA, no RRW  CARDIO: RRR without murmur  GI: good BS's,no

## 2019-03-25 ENCOUNTER — OFFICE VISIT (OUTPATIENT)
Dept: FAMILY MEDICINE CLINIC | Facility: CLINIC | Age: 64
End: 2019-03-25
Payer: MEDICAID

## 2019-03-25 VITALS
WEIGHT: 136 LBS | DIASTOLIC BLOOD PRESSURE: 70 MMHG | HEART RATE: 68 BPM | SYSTOLIC BLOOD PRESSURE: 110 MMHG | RESPIRATION RATE: 14 BRPM | BODY MASS INDEX: 27.42 KG/M2 | HEIGHT: 59 IN

## 2019-03-25 DIAGNOSIS — E03.9 ACQUIRED HYPOTHYROIDISM: ICD-10-CM

## 2019-03-25 DIAGNOSIS — E78.00 PURE HYPERCHOLESTEROLEMIA: Primary | ICD-10-CM

## 2019-03-25 PROCEDURE — 99214 OFFICE O/P EST MOD 30 MIN: CPT | Performed by: FAMILY MEDICINE

## 2019-03-25 RX ORDER — ATORVASTATIN CALCIUM 10 MG/1
10 TABLET, FILM COATED ORAL NIGHTLY
Qty: 90 TABLET | Refills: 0 | Status: SHIPPED | OUTPATIENT
Start: 2019-03-25 | End: 2020-05-22

## 2019-03-25 RX ORDER — DORZOLAMIDE HCL 20 MG/ML
2 SOLUTION/ DROPS OPHTHALMIC 3 TIMES DAILY
COMMUNITY
End: 2019-07-08 | Stop reason: ALTCHOICE

## 2019-03-25 NOTE — PROGRESS NOTES
Elizabeth Baron is a 62year old female. S:  Patient presents today with the following concerns:  · Patient here for follow up on headaches. Has not had a headache in over a week. Feeling much better. Taking xanax only prn. Mainly at night.   A full t daily. Disp:  Rfl: 3   ondansetron (ZOFRAN ODT) 4 MG Oral Tablet Dispersible Take 1 tablet (4 mg total) by mouth every 8 (eight) hours as needed for Nausea. Disp: 20 tablet Rfl: 0   mupirocin 2 % External Ointment Apply topically 3 (three) times daily.  Dis intact    ASSESSMENT AND PLAN:  Nanda Galloway is a 62year old female.   Pure hypercholesterolemia  (primary encounter diagnosis)  Acquired hypothyroidism    Orders Placed This Encounter      Lipid Panel [E]      TSH and Free T4 [E]    Meds & Refills for t

## 2019-04-03 ENCOUNTER — APPOINTMENT (OUTPATIENT)
Dept: LAB | Age: 64
End: 2019-04-03
Attending: FAMILY MEDICINE
Payer: MEDICAID

## 2019-04-03 DIAGNOSIS — E03.9 ACQUIRED HYPOTHYROIDISM: ICD-10-CM

## 2019-04-03 DIAGNOSIS — E78.00 PURE HYPERCHOLESTEROLEMIA: ICD-10-CM

## 2019-04-03 PROCEDURE — 84439 ASSAY OF FREE THYROXINE: CPT

## 2019-04-03 PROCEDURE — 80061 LIPID PANEL: CPT

## 2019-04-03 PROCEDURE — 84443 ASSAY THYROID STIM HORMONE: CPT

## 2019-05-01 ENCOUNTER — TELEPHONE (OUTPATIENT)
Dept: FAMILY MEDICINE CLINIC | Facility: CLINIC | Age: 64
End: 2019-05-01

## 2019-05-01 RX ORDER — METHYLPREDNISOLONE 4 MG/1
TABLET ORAL
Qty: 1 KIT | Refills: 0 | Status: SHIPPED | OUTPATIENT
Start: 2019-05-01 | End: 2019-07-08 | Stop reason: ALTCHOICE

## 2019-05-01 NOTE — TELEPHONE ENCOUNTER
Called and talked to patient after some discussion she will try the medrol dose karine sent this in for her

## 2019-05-01 NOTE — TELEPHONE ENCOUNTER
Patient is calling stating she has been having a headache for 3-4 days and was advised by [facility] not to take Advil. Patient has been taking tylenol which is not working for patient.  Patient would like to know if Dr. Kt Freeman can prescribe her medication for

## 2019-07-08 ENCOUNTER — OFFICE VISIT (OUTPATIENT)
Dept: FAMILY MEDICINE CLINIC | Facility: CLINIC | Age: 64
End: 2019-07-08
Payer: MEDICAID

## 2019-07-08 VITALS
RESPIRATION RATE: 16 BRPM | HEART RATE: 88 BPM | BODY MASS INDEX: 26.23 KG/M2 | HEIGHT: 59.75 IN | TEMPERATURE: 97 F | SYSTOLIC BLOOD PRESSURE: 138 MMHG | DIASTOLIC BLOOD PRESSURE: 80 MMHG | WEIGHT: 133.63 LBS

## 2019-07-08 DIAGNOSIS — H61.23 BILATERAL IMPACTED CERUMEN: ICD-10-CM

## 2019-07-08 DIAGNOSIS — G47.00 INSOMNIA, UNSPECIFIED TYPE: Primary | ICD-10-CM

## 2019-07-08 DIAGNOSIS — E03.9 ACQUIRED HYPOTHYROIDISM: ICD-10-CM

## 2019-07-08 PROCEDURE — 99213 OFFICE O/P EST LOW 20 MIN: CPT | Performed by: FAMILY MEDICINE

## 2019-07-08 PROCEDURE — 69210 REMOVE IMPACTED EAR WAX UNI: CPT | Performed by: FAMILY MEDICINE

## 2019-07-08 RX ORDER — TRAZODONE HYDROCHLORIDE 50 MG/1
50 TABLET ORAL NIGHTLY
Qty: 30 TABLET | Refills: 1 | Status: SHIPPED | OUTPATIENT
Start: 2019-07-08 | End: 2020-09-23

## 2019-07-08 RX ORDER — TIMOLOL MALEATE 5 MG/ML
1 SOLUTION OPHTHALMIC DAILY
Refills: 6 | COMMUNITY
Start: 2019-06-22

## 2019-07-09 NOTE — PROGRESS NOTES
Casper Ward is a 62year old female. S:  Patient presents today with the following concerns:  · Left ear canal feels plugged. Decreased hearing. Here for ear wax removal.  No ear pain. · Not sleeping well. Feels exhausted.   Stress with  wh ergocalciferol 59102 units Oral Cap Take 1 capsule (50,000 Units total) by mouth twice a week for 50 doses. Disp: 50 capsule Rfl: 0   Multiple Vitamins-Minerals (MULTI + OMEGA-3 ADULT GUMMIES OR) Take by mouth.  Disp:  Rfl:    Netarsudil Dimesylate 0.02 % is a 62year old female. Insomnia, unspecified type  (primary encounter diagnosis)  Bilateral impacted cerumen  Acquired hypothyroidism    No orders of the defined types were placed in this encounter.     Meds & Refills for this Visit:  Requested Prescript

## 2019-08-17 DIAGNOSIS — E03.9 ACQUIRED HYPOTHYROIDISM: ICD-10-CM

## 2019-08-19 RX ORDER — LEVOTHYROXINE SODIUM 88 UG/1
TABLET ORAL
Qty: 90 TABLET | Refills: 0 | Status: SHIPPED | OUTPATIENT
Start: 2019-08-19 | End: 2019-11-19

## 2019-08-19 NOTE — TELEPHONE ENCOUNTER
Levothyroxine Sodium 88MCG    LOV 7/8/19  TSH completed 4/3/19  TSH value 0.744 mlU/mL    No future appointments. Medication refill per protocol.

## 2019-11-11 ENCOUNTER — OFFICE VISIT (OUTPATIENT)
Dept: FAMILY MEDICINE CLINIC | Facility: CLINIC | Age: 64
End: 2019-11-11
Payer: MEDICAID

## 2019-11-11 VITALS
HEART RATE: 84 BPM | SYSTOLIC BLOOD PRESSURE: 116 MMHG | RESPIRATION RATE: 16 BRPM | BODY MASS INDEX: 28.66 KG/M2 | TEMPERATURE: 98 F | HEIGHT: 60 IN | WEIGHT: 146 LBS | DIASTOLIC BLOOD PRESSURE: 60 MMHG

## 2019-11-11 DIAGNOSIS — F43.0 STRESS REACTION: ICD-10-CM

## 2019-11-11 DIAGNOSIS — I83.813 VARICOSE VEINS OF BOTH LOWER EXTREMITIES WITH PAIN: Primary | ICD-10-CM

## 2019-11-11 DIAGNOSIS — I83.893 VARICOSE VEINS OF LEG WITH SWELLING, BILATERAL: ICD-10-CM

## 2019-11-11 PROCEDURE — 99214 OFFICE O/P EST MOD 30 MIN: CPT | Performed by: FAMILY MEDICINE

## 2019-11-11 NOTE — PROGRESS NOTES
Shankar Noriega is a 62year old female.     S:  Patient presents today with the following concerns:  Swelling (pt has had chronic swelling/getting worse in left leg and calf)   Weight Problem (increased stress causing weight gain     · Cousin that used to h latanoprost 0.005 % Ophthalmic Solution Place 1 drop into both eyes nightly. • Cholecalciferol (VITAMIN D3) 2000 UNITS Oral Tab Take 2 Tabs by mouth daily.      • buPROPion HCl ER, XL, (WELLBUTRIN XL) 300 MG Oral Tablet 24 Hr Take 1 tablet (300 mg tot encounter. Meds & Refills for this Visit:  Requested Prescriptions      No prescriptions requested or ordered in this encounter     Imaging & Consults:  SURGERY - INTERNAL    Patient is referred to Dr. Bianca Pro for evaluation of varicose veins.     Encourag

## 2019-11-19 DIAGNOSIS — E03.9 ACQUIRED HYPOTHYROIDISM: ICD-10-CM

## 2019-11-19 RX ORDER — LEVOTHYROXINE SODIUM 88 UG/1
TABLET ORAL
Qty: 90 TABLET | Refills: 0 | Status: SHIPPED | OUTPATIENT
Start: 2019-11-19 | End: 2020-02-11

## 2019-11-19 NOTE — TELEPHONE ENCOUNTER
Requested Prescriptions     Pending Prescriptions Disp Refills   • LEVOTHYROXINE SODIUM 88 MCG Oral Tab [Pharmacy Med Name: LEVOTHYROXINE 88 MCG TABLET] 90 tablet 0     Sig: TAKE 1 TABLET BY MOUTH IN THE MORNING BEFORE BREAKFAST     LOV 11/11/2019     Gloria

## 2019-11-21 ENCOUNTER — OFFICE VISIT (OUTPATIENT)
Dept: SURGERY | Facility: CLINIC | Age: 64
End: 2019-11-21
Payer: MEDICAID

## 2019-11-21 VITALS
DIASTOLIC BLOOD PRESSURE: 96 MMHG | RESPIRATION RATE: 16 BRPM | WEIGHT: 146 LBS | HEIGHT: 60 IN | BODY MASS INDEX: 28.66 KG/M2 | SYSTOLIC BLOOD PRESSURE: 148 MMHG | HEART RATE: 59 BPM

## 2019-11-21 DIAGNOSIS — I83.899 VARICOSE VEINS WITH COMPLICATIONS: ICD-10-CM

## 2019-11-21 DIAGNOSIS — M79.661 PAIN IN BOTH LOWER LEGS: Primary | ICD-10-CM

## 2019-11-21 DIAGNOSIS — I83.819 VARICOSE VEINS WITH PAIN: ICD-10-CM

## 2019-11-21 DIAGNOSIS — M79.662 PAIN IN BOTH LOWER LEGS: Primary | ICD-10-CM

## 2019-11-21 PROCEDURE — 99243 OFF/OP CNSLTJ NEW/EST LOW 30: CPT | Performed by: SURGERY

## 2019-12-09 ENCOUNTER — LAB ENCOUNTER (OUTPATIENT)
Dept: LAB | Age: 64
End: 2019-12-09
Attending: INTERNAL MEDICINE
Payer: MEDICAID

## 2019-12-09 DIAGNOSIS — E55.9 VITAMIN D DEFICIENCY: ICD-10-CM

## 2019-12-09 PROCEDURE — 82306 VITAMIN D 25 HYDROXY: CPT

## 2019-12-13 ENCOUNTER — HOSPITAL ENCOUNTER (OUTPATIENT)
Dept: ULTRASOUND IMAGING | Age: 64
Discharge: HOME | End: 2019-12-13
Attending: SURGERY
Payer: MEDICAID

## 2019-12-13 DIAGNOSIS — M79.661 PAIN IN BOTH LOWER LEGS: ICD-10-CM

## 2019-12-13 DIAGNOSIS — M79.662 PAIN IN BOTH LOWER LEGS: ICD-10-CM

## 2019-12-13 PROCEDURE — 93970 EXTREMITY STUDY: CPT | Performed by: SURGERY

## 2019-12-16 ENCOUNTER — TELEPHONE (OUTPATIENT)
Dept: FAMILY MEDICINE CLINIC | Facility: CLINIC | Age: 64
End: 2019-12-16

## 2019-12-16 DIAGNOSIS — Z12.11 ENCOUNTER FOR SCREENING COLONOSCOPY: Primary | ICD-10-CM

## 2019-12-16 NOTE — TELEPHONE ENCOUNTER
Patient forgot to ask Loretta Moseley for a new order for a colonoscopy. Please call her cell when order placed.

## 2019-12-16 NOTE — PROGRESS NOTES
Elizabeth Baron is a 62year old female. S:  Patient presents today with the following concerns:  · Follow up varicose veins. Had vein mapping on Friday. Saw Dr. Inga Michelle. Has RX for compression stockings. Hasn't gone yet. Knows needs to lose weight. Apply topically 3 (three) times daily. • Acidophilus/Pectin Oral Cap Take 1 capsule by mouth daily. • latanoprost 0.005 % Ophthalmic Solution Place 1 drop into both eyes nightly.          Patient Active Problem List:     Unspecified glaucoma(365.9) also help reduce stress and help with weight. Get the support hose. Elevate legs when can. Follow up with Dr. Pastora Guillermo regarding varicose veins. Follow up in 4-6 months or sooner if needed. Patient verbalizes understanding of treatment plan.

## 2019-12-18 ENCOUNTER — TELEPHONE (OUTPATIENT)
Dept: FAMILY MEDICINE CLINIC | Facility: CLINIC | Age: 64
End: 2019-12-18

## 2019-12-18 DIAGNOSIS — Z12.11 SCREENING FOR COLON CANCER: Primary | ICD-10-CM

## 2019-12-18 NOTE — TELEPHONE ENCOUNTER
Pt does not want to go to Dr Jamin Rivera as he is too far.  Was informed to call her Insurance asking who they recommend she see closer to her

## 2019-12-18 NOTE — TELEPHONE ENCOUNTER
Patient is calling because she was referred to Dr. Akira Michelle and their office does not accept her insurance. Patient was given Dr. Jerome Kam M.D. Gastroenterology and wants Ruby's opinion on her.  If Leigh Barry, pt will need a new

## 2019-12-31 ENCOUNTER — MED REC SCAN ONLY (OUTPATIENT)
Dept: SURGERY | Facility: CLINIC | Age: 64
End: 2019-12-31

## 2020-01-15 ENCOUNTER — LAB ENCOUNTER (OUTPATIENT)
Dept: LAB | Age: 65
End: 2020-01-15
Attending: INTERNAL MEDICINE
Payer: MEDICAID

## 2020-01-15 DIAGNOSIS — Z51.81 ENCOUNTER FOR THERAPEUTIC DRUG MONITORING: ICD-10-CM

## 2020-01-15 DIAGNOSIS — E66.9 OBESITY (BMI 30.0-34.9): ICD-10-CM

## 2020-01-15 LAB
ANION GAP SERPL CALC-SCNC: 2 MMOL/L (ref 0–18)
BUN BLD-MCNC: 17 MG/DL (ref 7–18)
BUN/CREAT SERPL: 15.2 (ref 10–20)
CALCIUM BLD-MCNC: 9.3 MG/DL (ref 8.5–10.1)
CHLORIDE SERPL-SCNC: 108 MMOL/L (ref 98–112)
CO2 SERPL-SCNC: 30 MMOL/L (ref 21–32)
CREAT BLD-MCNC: 1.12 MG/DL (ref 0.55–1.02)
GLUCOSE BLD-MCNC: 88 MG/DL (ref 70–99)
OSMOLALITY SERPL CALC.SUM OF ELEC: 291 MOSM/KG (ref 275–295)
PATIENT FASTING Y/N/NP: YES
POTASSIUM SERPL-SCNC: 4.3 MMOL/L (ref 3.5–5.1)
SODIUM SERPL-SCNC: 140 MMOL/L (ref 136–145)
VIT B12 SERPL-MCNC: >2000 PG/ML (ref 193–986)

## 2020-01-15 PROCEDURE — 80048 BASIC METABOLIC PNL TOTAL CA: CPT

## 2020-01-15 PROCEDURE — 82607 VITAMIN B-12: CPT

## 2020-02-03 NOTE — PROGRESS NOTES
Pt has been Vitamin B12 supplements. She agreed to stop. She said it has been helping with her energy. She said she will call if fatigue returns without it.     Telephone Information:  Mobile          [phone]

## 2020-02-04 ENCOUNTER — MED REC SCAN ONLY (OUTPATIENT)
Dept: SURGERY | Facility: CLINIC | Age: 65
End: 2020-02-04

## 2020-02-11 DIAGNOSIS — E03.9 ACQUIRED HYPOTHYROIDISM: ICD-10-CM

## 2020-02-11 RX ORDER — LEVOTHYROXINE SODIUM 88 UG/1
TABLET ORAL
Qty: 90 TABLET | Refills: 0 | Status: SHIPPED | OUTPATIENT
Start: 2020-02-11 | End: 2020-03-16

## 2020-03-13 DIAGNOSIS — E03.9 ACQUIRED HYPOTHYROIDISM: ICD-10-CM

## 2020-03-16 RX ORDER — LEVOTHYROXINE SODIUM 88 UG/1
TABLET ORAL
Qty: 30 TABLET | Refills: 0 | Status: SHIPPED | OUTPATIENT
Start: 2020-03-16 | End: 2020-06-10

## 2020-03-16 NOTE — TELEPHONE ENCOUNTER
Requested Prescriptions     Pending Prescriptions Disp Refills   • LEVOTHYROXINE SODIUM 88 MCG Oral Tab [Pharmacy Med Name: Levothyroxine Sodium Oral Tablet 88 MCG] 30 tablet 0     Sig: TAKE 1 TABLET BY MOUTH IN THE MORNING BEFORE BREAKFAST     LOV 12/16/2

## 2020-05-15 ENCOUNTER — OFFICE VISIT (OUTPATIENT)
Dept: OCCUPATIONAL MEDICINE | Age: 65
End: 2020-05-15
Attending: PHYSICIAN ASSISTANT

## 2020-05-22 DIAGNOSIS — E78.00 PURE HYPERCHOLESTEROLEMIA: ICD-10-CM

## 2020-05-22 RX ORDER — ATORVASTATIN CALCIUM 10 MG/1
10 TABLET, FILM COATED ORAL NIGHTLY
Qty: 90 TABLET | Refills: 0 | Status: SHIPPED | OUTPATIENT
Start: 2020-05-22 | End: 2020-10-08

## 2020-06-10 DIAGNOSIS — E03.9 ACQUIRED HYPOTHYROIDISM: ICD-10-CM

## 2020-06-10 RX ORDER — LEVOTHYROXINE SODIUM 88 UG/1
TABLET ORAL
Qty: 90 TABLET | Refills: 0 | Status: SHIPPED | OUTPATIENT
Start: 2020-06-10 | End: 2020-10-08

## 2020-06-10 NOTE — TELEPHONE ENCOUNTER
Refill request for:    Requested Prescriptions     Pending Prescriptions Disp Refills   • Levothyroxine Sodium 88 MCG Oral Tab [Pharmacy Med Name: LEVOTHYROXINE 88 MCG TABLET] 90 tablet 0     Sig: TAKE 1 TABLET BY MOUTH EVERY MORNING BEFORE BREAKFAST

## 2020-06-10 NOTE — TELEPHONE ENCOUNTER
Will fill today but needs OV for med check. Also please order TSH for patient and have her complete prior to OV. Thanks.

## 2020-07-24 ENCOUNTER — TELEPHONE (OUTPATIENT)
Dept: FAMILY MEDICINE CLINIC | Facility: CLINIC | Age: 65
End: 2020-07-24

## 2020-07-24 NOTE — TELEPHONE ENCOUNTER
Patient is calling because she was cutting her grass and noticed on her back L calf she had a bee sting. Patient tried applying mud and sting is still red and irritated. Patient would like to discuss next steps with nurse.

## 2020-07-25 ENCOUNTER — APPOINTMENT (OUTPATIENT)
Dept: ULTRASOUND IMAGING | Facility: HOSPITAL | Age: 65
End: 2020-07-25
Attending: EMERGENCY MEDICINE
Payer: MEDICAID

## 2020-07-25 ENCOUNTER — HOSPITAL ENCOUNTER (EMERGENCY)
Facility: HOSPITAL | Age: 65
Discharge: HOME | End: 2020-07-25
Attending: EMERGENCY MEDICINE
Payer: MEDICAID

## 2020-07-25 VITALS
HEIGHT: 59 IN | BODY MASS INDEX: 30.24 KG/M2 | RESPIRATION RATE: 18 BRPM | WEIGHT: 150 LBS | DIASTOLIC BLOOD PRESSURE: 105 MMHG | OXYGEN SATURATION: 100 % | HEART RATE: 73 BPM | TEMPERATURE: 98 F | SYSTOLIC BLOOD PRESSURE: 154 MMHG
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
## 2020-07-25 DIAGNOSIS — L03.116 CELLULITIS OF LEFT LOWER EXTREMITY: Primary | ICD-10-CM

## 2020-07-25 LAB
ALBUMIN SERPL-MCNC: 3.7 G/DL (ref 3.4–5)
ALBUMIN/GLOB SERPL: 0.9 {RATIO} (ref 1–2)
ALP LIVER SERPL-CCNC: 112 U/L (ref 50–130)
ALT SERPL-CCNC: 36 U/L (ref 13–56)
ANION GAP SERPL CALC-SCNC: 3 MMOL/L (ref 0–18)
AST SERPL-CCNC: 27 U/L (ref 15–37)
BASOPHILS # BLD AUTO: 0.01 X10(3) UL (ref 0–0.2)
BASOPHILS NFR BLD AUTO: 0.2 %
BILIRUB SERPL-MCNC: 0.6 MG/DL (ref 0.1–2)
BUN BLD-MCNC: 12 MG/DL (ref 7–18)
BUN/CREAT SERPL: 11.4 (ref 10–20)
CALCIUM BLD-MCNC: 8.9 MG/DL (ref 8.5–10.1)
CHLORIDE SERPL-SCNC: 108 MMOL/L (ref 98–112)
CO2 SERPL-SCNC: 30 MMOL/L (ref 21–32)
CREAT BLD-MCNC: 1.05 MG/DL (ref 0.55–1.02)
DEPRECATED RDW RBC AUTO: 47.8 FL (ref 35.1–46.3)
EOSINOPHIL # BLD AUTO: 0.12 X10(3) UL (ref 0–0.7)
EOSINOPHIL NFR BLD AUTO: 2 %
ERYTHROCYTE [DISTWIDTH] IN BLOOD BY AUTOMATED COUNT: 13.9 % (ref 11–15)
GLOBULIN PLAS-MCNC: 4.2 G/DL (ref 2.8–4.4)
GLUCOSE BLD-MCNC: 88 MG/DL (ref 70–99)
HCT VFR BLD AUTO: 42.7 % (ref 35–48)
HGB BLD-MCNC: 13.7 G/DL (ref 12–16)
IMM GRANULOCYTES # BLD AUTO: 0.02 X10(3) UL (ref 0–1)
IMM GRANULOCYTES NFR BLD: 0.3 %
LYMPHOCYTES # BLD AUTO: 1.76 X10(3) UL (ref 1–4)
LYMPHOCYTES NFR BLD AUTO: 29.4 %
M PROTEIN MFR SERPL ELPH: 7.9 G/DL (ref 6.4–8.2)
MCH RBC QN AUTO: 30 PG (ref 26–34)
MCHC RBC AUTO-ENTMCNC: 32.1 G/DL (ref 31–37)
MCV RBC AUTO: 93.4 FL (ref 80–100)
MONOCYTES # BLD AUTO: 0.61 X10(3) UL (ref 0.1–1)
MONOCYTES NFR BLD AUTO: 10.2 %
NEUTROPHILS # BLD AUTO: 3.47 X10 (3) UL (ref 1.5–7.7)
NEUTROPHILS # BLD AUTO: 3.47 X10(3) UL (ref 1.5–7.7)
NEUTROPHILS NFR BLD AUTO: 57.9 %
OSMOLALITY SERPL CALC.SUM OF ELEC: 291 MOSM/KG (ref 275–295)
PLATELET # BLD AUTO: 209 10(3)UL (ref 150–450)
POTASSIUM SERPL-SCNC: 4.3 MMOL/L (ref 3.5–5.1)
RBC # BLD AUTO: 4.57 X10(6)UL (ref 3.8–5.3)
SODIUM SERPL-SCNC: 141 MMOL/L (ref 136–145)
WBC # BLD AUTO: 6 X10(3) UL (ref 4–11)

## 2020-07-25 PROCEDURE — 85025 COMPLETE CBC W/AUTO DIFF WBC: CPT | Performed by: EMERGENCY MEDICINE

## 2020-07-25 PROCEDURE — 93971 EXTREMITY STUDY: CPT | Performed by: EMERGENCY MEDICINE

## 2020-07-25 PROCEDURE — 99284 EMERGENCY DEPT VISIT MOD MDM: CPT

## 2020-07-25 PROCEDURE — 80053 COMPREHEN METABOLIC PANEL: CPT | Performed by: EMERGENCY MEDICINE

## 2020-07-25 PROCEDURE — 96374 THER/PROPH/DIAG INJ IV PUSH: CPT

## 2020-07-25 RX ORDER — CEFADROXIL 500 MG/1
500 CAPSULE ORAL 2 TIMES DAILY
Qty: 20 CAPSULE | Refills: 0 | Status: SHIPPED | OUTPATIENT
Start: 2020-07-25 | End: 2020-08-04

## 2020-07-25 RX ORDER — CEFAZOLIN SODIUM/WATER 2 G/20 ML
2 SYRINGE (ML) INTRAVENOUS ONCE
Status: COMPLETED | OUTPATIENT
Start: 2020-07-25 | End: 2020-07-25

## 2020-07-25 RX ORDER — SULFAMETHOXAZOLE AND TRIMETHOPRIM 800; 160 MG/1; MG/1
1 TABLET ORAL 2 TIMES DAILY
Qty: 14 TABLET | Refills: 0 | Status: SHIPPED | OUTPATIENT
Start: 2020-07-25 | End: 2020-08-01

## 2020-07-25 NOTE — ED INITIAL ASSESSMENT (HPI)
Pt reports bee sting to left calf on Thursday, pt tried baking soda paste and mud paste with no relief. Swelling and redness noted. Pt is ambulatory.

## 2020-07-25 NOTE — ED PROVIDER NOTES
Patient Seen in: [facility] Emergency Department      History   Patient presents with:  Rash Skin Problem    Stated Complaint: cellulitis, insect bite 2 days ago    HPI    The patient states that she might of had bee sting she remembers something st Current:BP (!) 154/105   Pulse 73   Temp 98.1 °F (36.7 °C) (Oral)   Resp 18   Ht 149.9 cm (4' 11\")   Wt 68 kg   SpO2 100%   BMI 30.30 kg/m²         Physical Exam    General: Female no respiratory distress.   The patient is in no respiratory distress (jnd=[id number])    Result Date: 7/25/2020  PROCEDURE:  US VENOUS DOPPLER LEG LEFT - DIAG IMG (CPT=93971)  COMPARISON:  None. INDICATIONS:  Left lower extremity pain.   TECHNIQUE:  Real time, grey scale, and duplex ultrasound was used to evaluate the lower extr [place] [address]  [phone]    In 1 day            Medications Prescribed:  Current Discharge Medication List    START taking these medications    Cefadroxil 500 MG Oral Cap  Take 1 capsule (500 mg total) by mouth 2 (two) times daily for 10 days.   Qty: 20 capsu

## 2020-07-27 ENCOUNTER — TELEPHONE (OUTPATIENT)
Dept: FAMILY MEDICINE CLINIC | Facility: CLINIC | Age: 65
End: 2020-07-27

## 2020-07-27 NOTE — TELEPHONE ENCOUNTER
patient called requesting a [facility] ER follow up with Dr Clara Briones, no available appointments with any provider this week, please advise

## 2020-07-27 NOTE — TELEPHONE ENCOUNTER
Friday 31 add on at 3:30pm?  Is he able to make this?   That way, it'll be about a week after the ER visit so we will be able to look for change, effectiveness of the treatment

## 2020-07-31 ENCOUNTER — OFFICE VISIT (OUTPATIENT)
Dept: FAMILY MEDICINE CLINIC | Facility: CLINIC | Age: 65
End: 2020-07-31
Payer: MEDICAID

## 2020-07-31 VITALS
DIASTOLIC BLOOD PRESSURE: 70 MMHG | SYSTOLIC BLOOD PRESSURE: 120 MMHG | HEART RATE: 80 BPM | BODY MASS INDEX: 32.27 KG/M2 | TEMPERATURE: 98 F | WEIGHT: 162.19 LBS | RESPIRATION RATE: 18 BRPM | HEIGHT: 59.5 IN

## 2020-07-31 DIAGNOSIS — L03.116 CELLULITIS OF LEFT LOWER EXTREMITY: Primary | ICD-10-CM

## 2020-07-31 PROCEDURE — 3074F SYST BP LT 130 MM HG: CPT | Performed by: FAMILY MEDICINE

## 2020-07-31 PROCEDURE — 3078F DIAST BP <80 MM HG: CPT | Performed by: FAMILY MEDICINE

## 2020-07-31 PROCEDURE — 99213 OFFICE O/P EST LOW 20 MIN: CPT | Performed by: FAMILY MEDICINE

## 2020-07-31 PROCEDURE — 3008F BODY MASS INDEX DOCD: CPT | Performed by: FAMILY MEDICINE

## 2020-07-31 NOTE — PROGRESS NOTES
Patient presents with:  Er F/u: cellulitis left lower leg. Pt feels that it is improving     HPI:   Susana Ricketts is a 60year old female who presents to the office for ED follow up. In ED 7/25/2020. Started after a bug bite.     Started on cefadroxil an

## 2020-09-10 ENCOUNTER — HOSPITAL ENCOUNTER (OUTPATIENT)
Age: 65
Discharge: HOME | End: 2020-09-10
Attending: EMERGENCY MEDICINE
Payer: MEDICAID

## 2020-09-10 VITALS
SYSTOLIC BLOOD PRESSURE: 146 MMHG | OXYGEN SATURATION: 98 % | RESPIRATION RATE: 16 BRPM | HEART RATE: 78 BPM | WEIGHT: 150 LBS | DIASTOLIC BLOOD PRESSURE: 88 MMHG | HEIGHT: 59 IN | TEMPERATURE: 99 F | BODY MASS INDEX: 30.24 KG/M2

## 2020-09-10 DIAGNOSIS — M13.0 POLYARTHROPATHY: Primary | ICD-10-CM

## 2020-09-10 PROCEDURE — 99212 OFFICE O/P EST SF 10 MIN: CPT

## 2020-09-10 RX ORDER — IBUPROFEN 600 MG/1
600 TABLET ORAL ONCE
Status: COMPLETED | OUTPATIENT
Start: 2020-09-10 | End: 2020-09-10

## 2020-09-10 RX ORDER — ACETAMINOPHEN 500 MG
1000 TABLET ORAL ONCE
Status: COMPLETED | OUTPATIENT
Start: 2020-09-10 | End: 2020-09-10

## 2020-09-10 NOTE — ED INITIAL ASSESSMENT (HPI)
Pt with pain to L 4th finger, R thumb and R knee x 3 weeks - thinks it's arthritis; no injury; swelling at night

## 2020-09-10 NOTE — ED PROVIDER NOTES
Patient Seen in: [facility]      History   Patient presents with:  Pain    Stated Complaint: Stiff/pain on Fingers    HPI  This is 28-year-old woman, history of glaucoma, hyperlipidemia, here with complaint of multiple joint grecia on Fingers  Other systems are as noted in HPI. Constitutional and vital signs reviewed. All other systems reviewed and negative except as noted above.     Physical Exam     ED Triage Vitals [09/10/20 1807]   /88   Pulse 78   Resp 16   Temp 98.8 polyarthropathy present over the past 3 to 4 weeks. Physical exam is unremarkable, no warmth erythema or swelling of any of the joints involved, no bony tenderness she denies any trauma.   Believe this is osteoarthritis versus other inflammatory arthropath

## 2020-09-23 ENCOUNTER — OFFICE VISIT (OUTPATIENT)
Dept: FAMILY MEDICINE CLINIC | Facility: CLINIC | Age: 65
End: 2020-09-23
Payer: MEDICAID

## 2020-09-23 VITALS
DIASTOLIC BLOOD PRESSURE: 88 MMHG | WEIGHT: 160 LBS | HEART RATE: 60 BPM | SYSTOLIC BLOOD PRESSURE: 120 MMHG | HEIGHT: 60 IN | BODY MASS INDEX: 31.41 KG/M2 | RESPIRATION RATE: 16 BRPM | TEMPERATURE: 98 F

## 2020-09-23 DIAGNOSIS — E78.00 PURE HYPERCHOLESTEROLEMIA: ICD-10-CM

## 2020-09-23 DIAGNOSIS — M79.644 PAIN IN FINGER OF BOTH HANDS: ICD-10-CM

## 2020-09-23 DIAGNOSIS — Z13.1 SCREENING FOR DIABETES MELLITUS: ICD-10-CM

## 2020-09-23 DIAGNOSIS — M79.645 PAIN IN FINGER OF BOTH HANDS: ICD-10-CM

## 2020-09-23 DIAGNOSIS — M79.89 FINGER SWELLING: Primary | ICD-10-CM

## 2020-09-23 DIAGNOSIS — E03.9 ACQUIRED HYPOTHYROIDISM: ICD-10-CM

## 2020-09-23 PROCEDURE — 3074F SYST BP LT 130 MM HG: CPT | Performed by: FAMILY MEDICINE

## 2020-09-23 PROCEDURE — 99213 OFFICE O/P EST LOW 20 MIN: CPT | Performed by: FAMILY MEDICINE

## 2020-09-23 PROCEDURE — 3008F BODY MASS INDEX DOCD: CPT | Performed by: FAMILY MEDICINE

## 2020-09-23 PROCEDURE — 3079F DIAST BP 80-89 MM HG: CPT | Performed by: FAMILY MEDICINE

## 2020-09-23 NOTE — PROGRESS NOTES
Lula Dominguez is a 60year old female. S:  Patient presents today with the following concerns:   Finger Pain (ring finger left hand/thumb right hand, unable to bend knuckle,pain when bending)  · Right thumb and left ring finger. Stiff, swollen.   Start Reported on 9/23/2020 ) 90 tablet 3   • mupirocin 2 % External Ointment Apply topically 3 (three) times daily.        Patient Active Problem List:     Unspecified glaucoma(365.9)     Encounter for therapeutic drug monitoring     Abnormal EKG     Weight gain C-Reactive Protein [E]      TSH and Free T4 [E]      Lipid Panel [E]      Comp Metabolic Panel (14) [E]    Meds & Refills for this Visit:  Requested Prescriptions      No prescriptions requested or ordered in this encounter     Imaging & Consults:  XR HAND

## 2020-09-30 ENCOUNTER — HOSPITAL ENCOUNTER (OUTPATIENT)
Dept: GENERAL RADIOLOGY | Age: 65
Discharge: HOME | End: 2020-09-30
Attending: FAMILY MEDICINE
Payer: MEDICAID

## 2020-09-30 ENCOUNTER — LAB ENCOUNTER (OUTPATIENT)
Dept: LAB | Age: 65
End: 2020-09-30
Attending: FAMILY MEDICINE
Payer: MEDICAID

## 2020-09-30 ENCOUNTER — TELEPHONE (OUTPATIENT)
Dept: FAMILY MEDICINE CLINIC | Facility: CLINIC | Age: 65
End: 2020-09-30

## 2020-09-30 DIAGNOSIS — M79.644 PAIN IN FINGER OF BOTH HANDS: ICD-10-CM

## 2020-09-30 DIAGNOSIS — E03.9 ACQUIRED HYPOTHYROIDISM: ICD-10-CM

## 2020-09-30 DIAGNOSIS — E78.00 PURE HYPERCHOLESTEROLEMIA: ICD-10-CM

## 2020-09-30 DIAGNOSIS — Z13.1 SCREENING FOR DIABETES MELLITUS: ICD-10-CM

## 2020-09-30 DIAGNOSIS — M79.645 PAIN IN FINGER OF BOTH HANDS: ICD-10-CM

## 2020-09-30 DIAGNOSIS — M79.89 FINGER SWELLING: ICD-10-CM

## 2020-09-30 DIAGNOSIS — M25.561 RIGHT KNEE PAIN, UNSPECIFIED CHRONICITY: ICD-10-CM

## 2020-09-30 DIAGNOSIS — M25.561 RIGHT KNEE PAIN, UNSPECIFIED CHRONICITY: Primary | ICD-10-CM

## 2020-09-30 PROCEDURE — 80053 COMPREHEN METABOLIC PANEL: CPT

## 2020-09-30 PROCEDURE — 86431 RHEUMATOID FACTOR QUANT: CPT

## 2020-09-30 PROCEDURE — 86038 ANTINUCLEAR ANTIBODIES: CPT

## 2020-09-30 PROCEDURE — 86200 CCP ANTIBODY: CPT

## 2020-09-30 PROCEDURE — 84443 ASSAY THYROID STIM HORMONE: CPT

## 2020-09-30 PROCEDURE — 73130 X-RAY EXAM OF HAND: CPT | Performed by: FAMILY MEDICINE

## 2020-09-30 PROCEDURE — 85652 RBC SED RATE AUTOMATED: CPT

## 2020-09-30 PROCEDURE — 84439 ASSAY OF FREE THYROXINE: CPT

## 2020-09-30 PROCEDURE — 73560 X-RAY EXAM OF KNEE 1 OR 2: CPT | Performed by: FAMILY MEDICINE

## 2020-09-30 PROCEDURE — 36415 COLL VENOUS BLD VENIPUNCTURE: CPT

## 2020-09-30 PROCEDURE — 73140 X-RAY EXAM OF FINGER(S): CPT | Performed by: FAMILY MEDICINE

## 2020-09-30 PROCEDURE — 80061 LIPID PANEL: CPT

## 2020-09-30 PROCEDURE — 86140 C-REACTIVE PROTEIN: CPT

## 2020-09-30 NOTE — TELEPHONE ENCOUNTER
Mathew Escobedo from [facility] [address] states pt is there now and is complaining about R Knee pain. Did you want to place an order for that as well?

## 2020-10-06 ENCOUNTER — TELEPHONE (OUTPATIENT)
Dept: FAMILY MEDICINE CLINIC | Facility: CLINIC | Age: 65
End: 2020-10-06

## 2020-10-06 DIAGNOSIS — E03.9 ACQUIRED HYPOTHYROIDISM: ICD-10-CM

## 2020-10-06 NOTE — TELEPHONE ENCOUNTER
Patient is calling she can not figure out her My Chart to get her test results. If you someone could call her back she would appreciate it.

## 2020-10-07 DIAGNOSIS — E78.00 PURE HYPERCHOLESTEROLEMIA: ICD-10-CM

## 2020-10-08 RX ORDER — ATORVASTATIN CALCIUM 10 MG/1
TABLET, FILM COATED ORAL
Qty: 90 TABLET | Refills: 0 | Status: SHIPPED | OUTPATIENT
Start: 2020-10-08 | End: 2021-02-09

## 2020-10-08 RX ORDER — LEVOTHYROXINE SODIUM 88 UG/1
TABLET ORAL
Qty: 90 TABLET | Refills: 3 | Status: SHIPPED | OUTPATIENT
Start: 2020-10-08 | End: 2021-01-05

## 2020-10-08 NOTE — TELEPHONE ENCOUNTER
Requested Prescriptions     Pending Prescriptions Disp Refills   • ATORVASTATIN 10 MG Oral Tab [Pharmacy Med Name: Atorvastatin Calcium Oral Tablet 10 MG] 90 tablet 0     Sig: TAKE 1 TABLET BY MOUTH ONE TIME A DAY AT NIGHT     LOV 9/23/2020       Appointme

## 2020-10-08 NOTE — TELEPHONE ENCOUNTER
Results reviewed with patient. She is c/o ongoing left fourth finger, right thumb and knee pain. She asks what the next step is now that tests came back negative? She also states she has been taking atorvastatin, but ran out of it about 3 weeks ago.  She

## 2020-10-15 ENCOUNTER — TELEPHONE (OUTPATIENT)
Dept: FAMILY MEDICINE CLINIC | Facility: CLINIC | Age: 65
End: 2020-10-15

## 2020-10-15 DIAGNOSIS — M79.641 BILATERAL HAND PAIN: Primary | ICD-10-CM

## 2020-10-15 DIAGNOSIS — M79.642 BILATERAL HAND PAIN: Primary | ICD-10-CM

## 2020-10-15 DIAGNOSIS — M79.89 BILATERAL HAND SWELLING: ICD-10-CM

## 2020-10-15 RX ORDER — MELOXICAM 15 MG/1
15 TABLET ORAL DAILY
Qty: 30 TABLET | Refills: 0 | Status: SHIPPED | OUTPATIENT
Start: 2020-10-15 | End: 2021-03-09

## 2020-10-15 NOTE — TELEPHONE ENCOUNTER
Patient called states her right hand is still bothering her, she states tylonel is not helping wants to know if can get something called in for pain or what should do?

## 2020-10-15 NOTE — TELEPHONE ENCOUNTER
Monica Kwan,  [address]  [facility] [address] [phone]   Called and LMOM to call back

## 2020-10-15 NOTE — TELEPHONE ENCOUNTER
I sent over Meloxicam to her Dominique Ansari 26 and placed a referral to Dr. Elsy Guerra, rheumatologist for evaluation.

## 2020-11-10 ENCOUNTER — OFFICE VISIT (OUTPATIENT)
Dept: FAMILY MEDICINE CLINIC | Facility: CLINIC | Age: 65
End: 2020-11-10
Payer: MEDICAID

## 2020-11-10 VITALS
RESPIRATION RATE: 18 BRPM | OXYGEN SATURATION: 96 % | TEMPERATURE: 98 F | HEART RATE: 61 BPM | DIASTOLIC BLOOD PRESSURE: 82 MMHG | SYSTOLIC BLOOD PRESSURE: 138 MMHG

## 2020-11-10 DIAGNOSIS — Z20.822 ENCOUNTER FOR SCREENING LABORATORY TESTING FOR COVID-19 VIRUS: Primary | ICD-10-CM

## 2020-11-10 PROCEDURE — 99213 OFFICE O/P EST LOW 20 MIN: CPT | Performed by: NURSE PRACTITIONER

## 2020-11-10 PROCEDURE — 3079F DIAST BP 80-89 MM HG: CPT | Performed by: NURSE PRACTITIONER

## 2020-11-10 PROCEDURE — 3075F SYST BP GE 130 - 139MM HG: CPT | Performed by: NURSE PRACTITIONER

## 2020-11-10 NOTE — PROGRESS NOTES
CHIEF COMPLAINT:   Patient presents with:  Covid    HPI:   Jayde Helm is a 60year old female presents to clinic with complaints of covid exposure. Needs to get tested for work. Is a CNA. Patient has had no symptoms.    Reports: no nasal congestion, n • Cholecalciferol (VITAMIN D3) 2000 UNITS Oral Tab Take 2 Tabs by mouth daily.         Past Medical History:   Diagnosis Date   • Abdominal or pelvic swelling, mass or lump, unspecified site    • Cervicalgia    • Cystitis, unspecified    • Depression    • G 1. Encounter for screening laboratory testing for COVID-19 virus  - 2019 NOVEL CORONAVIRUS SARS-COV-2 BY PCR([facility]);  Future  - 2019 NOVEL CORONAVIRUS SARS-COV-2 BY PCR([facility])      Plan:   Comfort measures explained and discussed as listed in Patient Instruct · Wear a cloth face mask around other people. During a public health emergency, medical face masks may be reserved for healthcare workers. You may need to make a cloth face mask of your own. You can do this using a bandana, T-shirt, or other cloth.  The CDC · Wear a face mask. This is to protect other people from your germs. If you are not able to wear a mask, your caregivers should. During a public health emergency, medical face masks may be reserved for healthcare workers.  You may need to make a cloth face If you've been in the hospital for suspected or confirmed COVID-19 and now are home, follow all of your healthcare team's instructions. This will include when it's OK to stop self-isolation.  You may also get instructions on position changes to help your br Your limits are different if you've had COVID-19 in the last 3 months but are fully recovered without symptoms and you have been exposed to someone with COVID-19. If you are symptom-free, you don't need to stay home away from others or be retested.  The CDC When you return to public settings  When you are well enough to go outside your home, consider the CDC's guidance on cloth face masks:     · The CDC advises all people over age 2 to wear cloth face masks in public settings when around people outside of the © 6149-8090 [employer] [address]. [address] [facility], [facility] [place]. All rights reserved. This information is not intended as a substitute for professional medical care. Always follow your healthcare professional's instructions.

## 2020-12-08 ENCOUNTER — TELEPHONE (OUTPATIENT)
Dept: FAMILY MEDICINE CLINIC | Facility: CLINIC | Age: 65
End: 2020-12-08

## 2020-12-08 DIAGNOSIS — M79.645 PAIN IN FINGER OF LEFT HAND: ICD-10-CM

## 2020-12-08 DIAGNOSIS — M79.644 PAIN IN FINGER OF RIGHT HAND: Primary | ICD-10-CM

## 2020-12-08 NOTE — TELEPHONE ENCOUNTER
Dr. Aayush Lyn had been working with this patient and referred her to Dr. Arlette Lara DO for hand pain. However since [facility] will be leaving I am asking you patient can't get in with Dr. Arlette Lara DO and is requesting another referral for her hand pain.

## 2020-12-08 NOTE — TELEPHONE ENCOUNTER
Referral request Dr. Alix Gilmore M.D. Patient had x-ray 9/30/20  FINDINGS:  No evidence of acute displaced fracture or dislocation. Osteopenia. Ring noted along the 4th digit obscuring the proximal phalanx.   No significant marginal erosions identifi

## 2020-12-22 ENCOUNTER — OFFICE VISIT (OUTPATIENT)
Dept: ORTHOPEDICS CLINIC | Facility: CLINIC | Age: 65
End: 2020-12-22
Payer: MEDICAID

## 2020-12-22 VITALS — HEART RATE: 76 BPM | OXYGEN SATURATION: 99 %

## 2020-12-22 DIAGNOSIS — M65.311 TRIGGER FINGER OF RIGHT THUMB: Primary | ICD-10-CM

## 2020-12-22 PROCEDURE — 20550 NJX 1 TENDON SHEATH/LIGAMENT: CPT | Performed by: ORTHOPAEDIC SURGERY

## 2020-12-22 PROCEDURE — 99203 OFFICE O/P NEW LOW 30 MIN: CPT | Performed by: ORTHOPAEDIC SURGERY

## 2020-12-22 RX ORDER — TRIAMCINOLONE ACETONIDE 40 MG/ML
40 INJECTION, SUSPENSION INTRA-ARTICULAR; INTRAMUSCULAR ONCE
Status: COMPLETED | OUTPATIENT
Start: 2020-12-22 | End: 2020-12-22

## 2020-12-22 RX ADMIN — TRIAMCINOLONE ACETONIDE 40 MG: 40 INJECTION, SUSPENSION INTRA-ARTICULAR; INTRAMUSCULAR at 11:35:00

## 2020-12-22 NOTE — PROCEDURES
Written consent was obtained. Skin was prepped with ChloraPrep. 1 mL of 40 mg of Kenalog and 1 mL of 1% lidocaine was injected into hand overlying the A1 pulley of right thumb. Patient tolerated the procedure. No complications were encountered.   Band-

## 2020-12-22 NOTE — H&P
[facility] New Patient Note    CC: R trigger thumb and L ring finger trigger digit    HPI: This 60year old  female presents with right thumb and left ring finger pain for about 3 to 4 months. She tried NSAIDs and turmeric without really any relief. topically 3 (three) times daily. • Bimatoprost (LUMIGAN) 0.01 % Ophthalmic Solution Place 1 drop into both eyes every evening. • Multiple Vitamins-Minerals (CENTRUM SILVER ADULT 50+) Oral Tab Take by mouth daily.        • latanoprost 0.005 % Ophth Exam:    Pulse 76   SpO2 99%     Constitutional: Patient is oriented to person, place, and time. Patient appears well-developed and well-nourished. No distress. Head: Normocephalic. Eyes: Pupils are equal, round, and reactive to light.  Conjunctivae and the plan. Alix Gilmore MD  Hand, Wrist, & Elbow Surgery  [facility]  [facility] [address] [facility], [facility], [address] [facility]. [facility]  [email or web address]  t: [id number]  f: [phone]

## 2021-01-05 DIAGNOSIS — E03.9 ACQUIRED HYPOTHYROIDISM: ICD-10-CM

## 2021-01-05 RX ORDER — LEVOTHYROXINE SODIUM 88 UG/1
TABLET ORAL
Qty: 90 TABLET | Refills: 0 | Status: SHIPPED | OUTPATIENT
Start: 2021-01-05 | End: 2021-02-09

## 2021-02-09 ENCOUNTER — OFFICE VISIT (OUTPATIENT)
Dept: FAMILY MEDICINE CLINIC | Facility: CLINIC | Age: 66
End: 2021-02-09
Payer: MEDICAID

## 2021-02-09 VITALS
HEIGHT: 59 IN | WEIGHT: 170.19 LBS | TEMPERATURE: 96 F | BODY MASS INDEX: 34.31 KG/M2 | SYSTOLIC BLOOD PRESSURE: 138 MMHG | RESPIRATION RATE: 18 BRPM | DIASTOLIC BLOOD PRESSURE: 80 MMHG | HEART RATE: 78 BPM

## 2021-02-09 DIAGNOSIS — Z12.4 SCREENING FOR CERVICAL CANCER: ICD-10-CM

## 2021-02-09 DIAGNOSIS — Z78.0 POSTMENOPAUSAL: ICD-10-CM

## 2021-02-09 DIAGNOSIS — Z00.00 WELL ADULT EXAM: Primary | ICD-10-CM

## 2021-02-09 DIAGNOSIS — Z12.11 SCREEN FOR COLON CANCER: ICD-10-CM

## 2021-02-09 DIAGNOSIS — E78.00 PURE HYPERCHOLESTEROLEMIA: ICD-10-CM

## 2021-02-09 DIAGNOSIS — E03.9 ACQUIRED HYPOTHYROIDISM: ICD-10-CM

## 2021-02-09 DIAGNOSIS — Z12.31 ENCOUNTER FOR SCREENING MAMMOGRAM FOR BREAST CANCER: ICD-10-CM

## 2021-02-09 PROCEDURE — 88175 CYTOPATH C/V AUTO FLUID REDO: CPT | Performed by: PHYSICIAN ASSISTANT

## 2021-02-09 PROCEDURE — 99397 PER PM REEVAL EST PAT 65+ YR: CPT | Performed by: PHYSICIAN ASSISTANT

## 2021-02-09 PROCEDURE — 3075F SYST BP GE 130 - 139MM HG: CPT | Performed by: PHYSICIAN ASSISTANT

## 2021-02-09 PROCEDURE — 87624 HPV HI-RISK TYP POOLED RSLT: CPT | Performed by: PHYSICIAN ASSISTANT

## 2021-02-09 PROCEDURE — 3079F DIAST BP 80-89 MM HG: CPT | Performed by: PHYSICIAN ASSISTANT

## 2021-02-09 PROCEDURE — 3008F BODY MASS INDEX DOCD: CPT | Performed by: PHYSICIAN ASSISTANT

## 2021-02-09 RX ORDER — LEVOTHYROXINE SODIUM 88 UG/1
TABLET ORAL
Qty: 90 TABLET | Refills: 3 | Status: SHIPPED | OUTPATIENT
Start: 2021-02-09 | End: 2021-05-03

## 2021-02-09 RX ORDER — ATORVASTATIN CALCIUM 10 MG/1
10 TABLET, FILM COATED ORAL NIGHTLY
Qty: 90 TABLET | Refills: 3 | Status: SHIPPED | OUTPATIENT
Start: 2021-02-09 | End: 2021-05-03

## 2021-02-09 NOTE — PROGRESS NOTES
DATE OF EXAM  2/9/2021    CHIEF COMPLAINT/REASON FOR VISIT  Annual exam.    HISTORY OF PRESENT ILLNESS  Minnie Simmonds presents today for routine annual physical examination.    - Due for pap smear, colon cancer screen, mammo and bone density.  - Declines Tabs by mouth daily. , Disp: , Rfl:     •  buPROPion HCl ER, XL, 150 MG Oral Tablet 24 Hr, Take 1 tablet (150 mg total) by mouth daily.  (Patient not taking: Reported on 2/9/2021 ), Disp: 90 tablet, Rfl: 3    No current facility-administered medications on f cold or recent illness. No visual changes. No troubles hearing. No troubles swallowing. RESPIRATORY: No cough or shortness of breath  HEART: No chest pain, irregular heartbeat, swelling extremities.   MUSCULOSKELETAL:  No muscle pains, muscle aches, joint rubs, or gallops. LUNGS: Normal effort on room air. Clear to auscultation throughout. ABDOMEN:  Bowel sounds present throughout. Rounded, soft, without tenderness to palpation with no organomegaly.   PELVIC:  Normal female external genitalia without lesi

## 2021-02-10 LAB — HPV I/H RISK 1 DNA SPEC QL NAA+PROBE: NEGATIVE

## 2021-03-09 ENCOUNTER — OFFICE VISIT (OUTPATIENT)
Dept: RHEUMATOLOGY | Facility: CLINIC | Age: 66
End: 2021-03-09
Payer: MEDICAID

## 2021-03-09 VITALS
DIASTOLIC BLOOD PRESSURE: 74 MMHG | HEART RATE: 80 BPM | RESPIRATION RATE: 16 BRPM | SYSTOLIC BLOOD PRESSURE: 122 MMHG | TEMPERATURE: 97 F | HEIGHT: 59 IN | WEIGHT: 174 LBS | BODY MASS INDEX: 35.08 KG/M2

## 2021-03-09 DIAGNOSIS — M19.042 PRIMARY OSTEOARTHRITIS OF BOTH HANDS: Primary | ICD-10-CM

## 2021-03-09 DIAGNOSIS — M79.89 BILATERAL HAND SWELLING: ICD-10-CM

## 2021-03-09 DIAGNOSIS — M79.642 BILATERAL HAND PAIN: ICD-10-CM

## 2021-03-09 DIAGNOSIS — R63.5 WEIGHT GAIN: ICD-10-CM

## 2021-03-09 DIAGNOSIS — M19.041 PRIMARY OSTEOARTHRITIS OF BOTH HANDS: Primary | ICD-10-CM

## 2021-03-09 DIAGNOSIS — M85.80 OSTEOPENIA, UNSPECIFIED LOCATION: ICD-10-CM

## 2021-03-09 DIAGNOSIS — M79.641 BILATERAL HAND PAIN: ICD-10-CM

## 2021-03-09 DIAGNOSIS — M65.342 TRIGGER RING FINGER OF LEFT HAND: ICD-10-CM

## 2021-03-09 DIAGNOSIS — R79.82 ELEVATED C-REACTIVE PROTEIN (CRP): ICD-10-CM

## 2021-03-09 PROCEDURE — 3078F DIAST BP <80 MM HG: CPT | Performed by: INTERNAL MEDICINE

## 2021-03-09 PROCEDURE — 3008F BODY MASS INDEX DOCD: CPT | Performed by: INTERNAL MEDICINE

## 2021-03-09 PROCEDURE — 3074F SYST BP LT 130 MM HG: CPT | Performed by: INTERNAL MEDICINE

## 2021-03-09 PROCEDURE — 99204 OFFICE O/P NEW MOD 45 MIN: CPT | Performed by: INTERNAL MEDICINE

## 2021-03-09 RX ORDER — MELOXICAM 15 MG/1
15 TABLET ORAL DAILY PRN
Qty: 30 TABLET | Refills: 2 | Status: SHIPPED | OUTPATIENT
Start: 2021-03-09 | End: 2021-06-07 | Stop reason: ALTCHOICE

## 2021-03-09 NOTE — PROGRESS NOTES
?  RHEUMATOLOGY NEW PATIENT   Date of visit: 3/9/2021  ? Patient presents with:  Establish Care: new pt. Dr. Au Dates referral. Saw another dr who did an injection in right thumb. Left ring finger stiff and sticks. Swollen fingers.  No other joint pain or sw affected joints of the hands  -- okay to start meloxicam as needed on days when pain is severe.  Just remember to avoid other NSAIDs while taking the meloxicam (advil, ibuprofen, aleve)  -- follow up with orthopedic surgeon for injection of the left hand si female with the following active problems who is referred for rheumatologic evaluation due to joint pain. States she noticed pain and stiffness of the right thumb pain and stiffness as well as left ring finger pain.  Had difficulty getting her ring off photosensitive rash, elevated or scarring rashes, Raynaud's phenomenon, prior hematologic abnormality, prior renal or liver disease, or history of seizures. Denies hx of pericarditis or pleuritis.    No history of prior blood clot in the legs or lungs, stro for Pain., Disp: 30 tablet, Rfl: 2  Levothyroxine Sodium 88 MCG Oral Tab, TAKE 1 TABLET BY MOUTH EVERY DAY BEFORE BREAKFAST, Disp: 90 tablet, Rfl: 3  atorvastatin 10 MG Oral Tab, Take 1 tablet (10 mg total) by mouth nightly., Disp: 90 tablet, Rfl: 3  Turme Neurological: Negative for dizziness, tingling, seizures, weakness and headaches. Endo/Heme/Allergies: Does not bruise/bleed easily. Psychiatric/Behavioral: Positive for depression. The patient is not nervous/anxious and does not have insomnia. tenderness  Bilateral knees without medial joint line tenderness, mild crepitus, no effusion. Lymphadenopathy:      Cervical: No cervical adenopathy. Skin:     General: Skin is warm and dry. Findings: No erythema or rash.       Comments: No nail pi dislocation.  Osteopenia.  Ring noted along the 4th digit obscuring the proximal phalanx.  No significant marginal erosions identified.  Mild scattered osteoarthritic changes in the interphalangeal   joints.            Impression   CONCLUSION:  No evidence 07/25/2020    NE 3.47 07/25/2020    LYMABS 1.76 07/25/2020    MOABSO 0.61 07/25/2020    EOABSO 0.12 07/25/2020    BAABSO 0.01 07/25/2020     Lab Results   Component Value Date    GLU 86 09/30/2020    BUN 14 09/30/2020    BUNCREA 12.3 09/30/2020    CREATSER

## 2021-03-09 NOTE — PATIENT INSTRUCTIONS
-- start over the counter voltaren gel up to 3x/day over affected joints of the hands  -- okay to start meloxicam as needed on days when pain is severe.  Just remember to avoid other NSAIDs while taking the meloxicam (advil, ibuprofen, aleve)  -- follow up

## 2021-03-12 DIAGNOSIS — Z23 NEED FOR VACCINATION: ICD-10-CM

## 2021-03-25 ENCOUNTER — TELEPHONE (OUTPATIENT)
Dept: FAMILY MEDICINE CLINIC | Facility: CLINIC | Age: 66
End: 2021-03-25

## 2021-03-25 NOTE — TELEPHONE ENCOUNTER
Pt states she has been out of her Lipitor for a month and her pharmacy keeps stating they will call our office to get a refill. Pt states she still has not received the medication and she is needing a refill.

## 2021-03-25 NOTE — TELEPHONE ENCOUNTER
Called pharmacy and patient picked up the prescription on 2/9/21 I then called the patietn and she said she has not had this for 2 months.  I explained that the bottle will say atorvastatin not lipitor after some back and forth I spelled it for her and she

## 2021-04-22 ENCOUNTER — TELEPHONE (OUTPATIENT)
Dept: FAMILY MEDICINE CLINIC | Facility: CLINIC | Age: 66
End: 2021-04-22

## 2021-04-22 NOTE — TELEPHONE ENCOUNTER
Mammogram is overdue. Mammogram was ordered on 2/9/21 at office visit.  Reminder letter sent via [employer]

## 2021-04-27 ENCOUNTER — OFFICE VISIT (OUTPATIENT)
Dept: ORTHOPEDICS CLINIC | Facility: CLINIC | Age: 66
End: 2021-04-27
Payer: MEDICAID

## 2021-04-27 DIAGNOSIS — M65.342 TRIGGER RING FINGER OF LEFT HAND: Primary | ICD-10-CM

## 2021-04-27 PROCEDURE — 20550 NJX 1 TENDON SHEATH/LIGAMENT: CPT | Performed by: ORTHOPAEDIC SURGERY

## 2021-04-27 RX ORDER — TRIAMCINOLONE ACETONIDE 40 MG/ML
40 INJECTION, SUSPENSION INTRA-ARTICULAR; INTRAMUSCULAR ONCE
Status: COMPLETED | OUTPATIENT
Start: 2021-04-27 | End: 2021-04-27

## 2021-04-27 RX ADMIN — TRIAMCINOLONE ACETONIDE 40 MG: 40 INJECTION, SUSPENSION INTRA-ARTICULAR; INTRAMUSCULAR at 09:39:00

## 2021-04-27 NOTE — PROCEDURES
Written consent was obtained. Skin was prepped with ChloraPrep. 1 mL of 40 mg of Kenalog and 1 mL of 1% lidocaine was injected into hand overlying the A1 pulley of left ring finger  Patient tolerated the procedure. No complications were encountered.   Ba

## 2021-05-03 ENCOUNTER — LAB ENCOUNTER (OUTPATIENT)
Dept: LAB | Age: 66
End: 2021-05-03
Attending: INTERNAL MEDICINE
Payer: MEDICAID

## 2021-05-03 ENCOUNTER — TELEPHONE (OUTPATIENT)
Dept: FAMILY MEDICINE CLINIC | Facility: CLINIC | Age: 66
End: 2021-05-03

## 2021-05-03 DIAGNOSIS — E78.00 PURE HYPERCHOLESTEROLEMIA: ICD-10-CM

## 2021-05-03 DIAGNOSIS — Z51.81 ENCOUNTER FOR THERAPEUTIC DRUG MONITORING: ICD-10-CM

## 2021-05-03 DIAGNOSIS — E66.9 OBESITY (BMI 30.0-34.9): ICD-10-CM

## 2021-05-03 DIAGNOSIS — E55.9 VITAMIN D DEFICIENCY: ICD-10-CM

## 2021-05-03 DIAGNOSIS — E03.9 ACQUIRED HYPOTHYROIDISM: ICD-10-CM

## 2021-05-03 PROCEDURE — 84443 ASSAY THYROID STIM HORMONE: CPT

## 2021-05-03 PROCEDURE — 80048 BASIC METABOLIC PNL TOTAL CA: CPT

## 2021-05-03 PROCEDURE — 36415 COLL VENOUS BLD VENIPUNCTURE: CPT

## 2021-05-03 PROCEDURE — 82306 VITAMIN D 25 HYDROXY: CPT

## 2021-05-03 PROCEDURE — 82607 VITAMIN B-12: CPT

## 2021-05-03 RX ORDER — LEVOTHYROXINE SODIUM 88 UG/1
TABLET ORAL
Qty: 90 TABLET | Refills: 2 | Status: SHIPPED | OUTPATIENT
Start: 2021-05-03

## 2021-05-03 RX ORDER — ATORVASTATIN CALCIUM 10 MG/1
10 TABLET, FILM COATED ORAL NIGHTLY
Qty: 90 TABLET | Refills: 2 | Status: SHIPPED | OUTPATIENT
Start: 2021-05-03

## 2021-05-03 NOTE — TELEPHONE ENCOUNTER
Patient came in office and said her  has alzheimer's diease and has threw her medication away she said Dr Blessing Mera is aware of this , she is asking for a refill of all her medications.    Levothyroxine Sodium 88 MCG Oral Tab    atorvastatin 10 MG Oral

## 2021-05-03 NOTE — TELEPHONE ENCOUNTER
Re-issued both Atorvastatin and Levothyroxine for the balance open on the script to [facility]

## 2021-05-03 NOTE — TELEPHONE ENCOUNTER
Called pt advised medication was refilled and to reach out to pharmacy. Pt verbalized understanding.

## 2021-05-15 NOTE — PROGRESS NOTES
MD released comments on [place]. Patient is active on [employer].   Rx sent to pharmacy Procedure scheduled as Elective.

## 2021-05-25 ENCOUNTER — OFFICE VISIT (OUTPATIENT)
Dept: ORTHOPEDICS CLINIC | Facility: CLINIC | Age: 66
End: 2021-05-25
Payer: MEDICAID

## 2021-05-25 VITALS — HEART RATE: 73 BPM | OXYGEN SATURATION: 99 %

## 2021-05-25 DIAGNOSIS — M65.342 TRIGGER RING FINGER OF LEFT HAND: Primary | ICD-10-CM

## 2021-05-25 PROCEDURE — 20550 NJX 1 TENDON SHEATH/LIGAMENT: CPT | Performed by: ORTHOPAEDIC SURGERY

## 2021-05-25 RX ORDER — TRIAMCINOLONE ACETONIDE 40 MG/ML
40 INJECTION, SUSPENSION INTRA-ARTICULAR; INTRAMUSCULAR ONCE
Status: COMPLETED | OUTPATIENT
Start: 2021-05-25 | End: 2021-05-25

## 2021-05-25 RX ADMIN — TRIAMCINOLONE ACETONIDE 40 MG: 40 INJECTION, SUSPENSION INTRA-ARTICULAR; INTRAMUSCULAR at 09:07:00

## 2021-05-25 NOTE — PROCEDURES
Patient reported 95% improvement after the corticosteroid injection but she still has a subtle trigger which is nonpainful but causes functional issues. We will repeat the injection today. Written consent was obtained. Skin was prepped with ChloraPrep.

## 2021-06-04 ENCOUNTER — TELEPHONE (OUTPATIENT)
Dept: FAMILY MEDICINE CLINIC | Facility: CLINIC | Age: 66
End: 2021-06-04
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
## 2021-06-07 NOTE — PROGRESS NOTES
Patient presents with: Anxiety: at work and at home     HPI:   Luciano Ellis is a 73year old female who presents to the office for anxiety discussion. Many stressors. Impacting her stomach. Sometimes can get back enough to the point where she vomits. including Anxiety (at work and at home). 1. Stress reaction  2. Nausea  Stresses, anxiety. Causing GI issues  In the past has used Zofran, will refill  We discussed moods at length. She has stressors from several sources.   These are not getting better

## 2021-07-06 ENCOUNTER — OFFICE VISIT (OUTPATIENT)
Dept: ORTHOPEDICS CLINIC | Facility: CLINIC | Age: 66
End: 2021-07-06
Payer: MEDICAID

## 2021-07-06 VITALS — HEART RATE: 69 BPM | OXYGEN SATURATION: 100 %

## 2021-07-06 DIAGNOSIS — M65.342 TRIGGER RING FINGER OF LEFT HAND: Primary | ICD-10-CM

## 2021-07-06 PROCEDURE — 99213 OFFICE O/P EST LOW 20 MIN: CPT | Performed by: ORTHOPAEDIC SURGERY

## 2021-07-06 NOTE — PROGRESS NOTES
[facility] New Patient Note    CC: R trigger thumb and L ring finger trigger digit    HPI: This 73year old  female presents with right thumb and left ring finger pain for about 3 to 4 months. She tried NSAIDs and turmeric without really any relief. nightly. • Cholecalciferol (VITAMIN D3) 2000 UNITS Oral Tab Take 2 Tabs by mouth daily.        No Known Allergies  Family History   Problem Relation Age of Onset   • Diabetes Father    • High Cholesterol Mother    • Hypertension Mother      Social His Cardiovascular: Normal rate and intact distal pulses. Pulmonary/Chest: Effort normal. No stridor. No respiratory distress. Patient has no audible wheezes. Neurological: No obvious cranial nerve deficit. Skin: Skin is warm and dry. Not diaphoretic.

## 2021-08-04 ENCOUNTER — TELEPHONE (OUTPATIENT)
Dept: FAMILY MEDICINE CLINIC | Facility: CLINIC | Age: 66
End: 2021-08-04

## 2021-08-04 NOTE — TELEPHONE ENCOUNTER
Pt states she was prescribed ozempic by Dr. Damion Olivarez. Pt states she was reading up on the side effects and pt wants to make sure that Dr. David Westbrook is in agreeance with her taking medication. Pt states the medication is for weight loss.  Pt states she was prescri

## 2021-08-04 NOTE — TELEPHONE ENCOUNTER
It is an effective med for weight loss. Have seen a lot of success with it. Keep on the look out for side effects, but should be good to go.

## 2021-10-27 ENCOUNTER — TELEPHONE (OUTPATIENT)
Dept: FAMILY MEDICINE CLINIC | Facility: CLINIC | Age: 66
End: 2021-10-27

## 2021-10-27 NOTE — TELEPHONE ENCOUNTER
Pt is requesting to see a counselor. She was informed to contact her [facility] but she is not having any luck.  She is overwhelmed with work and taking care of her

## 2021-10-28 NOTE — TELEPHONE ENCOUNTER
Had long conversation with patient today. She works at [employer] part time trying to get a full time job here at [employer] to get better benefits but no one is calling her back.  has dementia he is no help.    She is estranged from her son and

## 2021-12-17 ENCOUNTER — TELEPHONE (OUTPATIENT)
Dept: FAMILY MEDICINE CLINIC | Facility: CLINIC | Age: 66
End: 2021-12-17

## 2021-12-17 DIAGNOSIS — M79.673 HEEL PAIN, UNSPECIFIED LATERALITY: Primary | ICD-10-CM

## 2022-03-28 ENCOUNTER — PATIENT OUTREACH (OUTPATIENT)
Dept: FAMILY MEDICINE CLINIC | Facility: CLINIC | Age: 67
End: 2022-03-28

## 2022-10-23 DIAGNOSIS — E03.9 ACQUIRED HYPOTHYROIDISM: ICD-10-CM

## 2022-10-24 RX ORDER — LEVOTHYROXINE SODIUM 88 UG/1
TABLET ORAL
Qty: 90 TABLET | Refills: 0 | Status: SHIPPED | OUTPATIENT
Start: 2022-10-24

## 2022-11-26 DIAGNOSIS — E78.00 PURE HYPERCHOLESTEROLEMIA: ICD-10-CM

## 2022-12-14 ENCOUNTER — OFFICE VISIT (OUTPATIENT)
Dept: FAMILY MEDICINE CLINIC | Facility: CLINIC | Age: 67
End: 2022-12-14
Payer: COMMERCIAL

## 2022-12-14 VITALS
SYSTOLIC BLOOD PRESSURE: 120 MMHG | HEIGHT: 59 IN | BODY MASS INDEX: 28.83 KG/M2 | DIASTOLIC BLOOD PRESSURE: 82 MMHG | RESPIRATION RATE: 16 BRPM | WEIGHT: 143 LBS | HEART RATE: 52 BPM

## 2022-12-14 DIAGNOSIS — Z12.11 COLON CANCER SCREENING: ICD-10-CM

## 2022-12-14 DIAGNOSIS — E55.9 VITAMIN D DEFICIENCY: ICD-10-CM

## 2022-12-14 DIAGNOSIS — Z12.31 SCREENING MAMMOGRAM FOR BREAST CANCER: ICD-10-CM

## 2022-12-14 DIAGNOSIS — E04.2 MULTIPLE THYROID NODULES: ICD-10-CM

## 2022-12-14 DIAGNOSIS — Z00.00 LABORATORY EXAMINATION ORDERED AS PART OF A COMPLETE PHYSICAL EXAMINATION: ICD-10-CM

## 2022-12-14 DIAGNOSIS — Z65.8 PSYCHOSOCIAL STRESSORS: ICD-10-CM

## 2022-12-14 DIAGNOSIS — Z00.00 WELL ADULT EXAM: Primary | ICD-10-CM

## 2022-12-14 DIAGNOSIS — Z78.0 POSTMENOPAUSAL: ICD-10-CM

## 2022-12-14 PROCEDURE — 3008F BODY MASS INDEX DOCD: CPT | Performed by: PHYSICIAN ASSISTANT

## 2022-12-14 PROCEDURE — 99397 PER PM REEVAL EST PAT 65+ YR: CPT | Performed by: PHYSICIAN ASSISTANT

## 2022-12-14 PROCEDURE — 3079F DIAST BP 80-89 MM HG: CPT | Performed by: PHYSICIAN ASSISTANT

## 2022-12-14 PROCEDURE — 3074F SYST BP LT 130 MM HG: CPT | Performed by: PHYSICIAN ASSISTANT

## 2022-12-14 NOTE — PATIENT INSTRUCTIONS
Massage gun/ theragun (I have sharper image from [employer], was around $45 I believe? But any similar will work).

## 2023-01-10 ENCOUNTER — HOSPITAL ENCOUNTER (OUTPATIENT)
Age: 68
Discharge: ANOTHER HOSPITAL | End: 2023-01-10
Attending: EMERGENCY MEDICINE
Payer: COMMERCIAL

## 2023-01-10 ENCOUNTER — APPOINTMENT (OUTPATIENT)
Dept: GENERAL RADIOLOGY | Facility: HOSPITAL | Age: 68
End: 2023-01-10
Payer: COMMERCIAL

## 2023-01-10 ENCOUNTER — HOSPITAL ENCOUNTER (EMERGENCY)
Facility: HOSPITAL | Age: 68
Discharge: HOME | End: 2023-01-11
Attending: EMERGENCY MEDICINE
Payer: COMMERCIAL

## 2023-01-10 ENCOUNTER — TELEPHONE (OUTPATIENT)
Dept: FAMILY MEDICINE CLINIC | Facility: CLINIC | Age: 68
End: 2023-01-10

## 2023-01-10 VITALS
HEIGHT: 60 IN | SYSTOLIC BLOOD PRESSURE: 173 MMHG | TEMPERATURE: 99 F | WEIGHT: 134 LBS | BODY MASS INDEX: 26.31 KG/M2 | OXYGEN SATURATION: 98 % | DIASTOLIC BLOOD PRESSURE: 98 MMHG | RESPIRATION RATE: 16 BRPM | HEART RATE: 64 BPM

## 2023-01-10 DIAGNOSIS — R07.89 CHEST PAIN, ATYPICAL: Primary | ICD-10-CM

## 2023-01-10 DIAGNOSIS — R07.9 ACUTE CHEST PAIN: Primary | ICD-10-CM

## 2023-01-10 LAB
ALBUMIN SERPL-MCNC: 3.7 G/DL (ref 3.4–5)
ALBUMIN/GLOB SERPL: 0.9 {RATIO} (ref 1–2)
ALP LIVER SERPL-CCNC: 86 U/L
ALT SERPL-CCNC: 20 U/L
ANION GAP SERPL CALC-SCNC: 6 MMOL/L (ref 0–18)
AST SERPL-CCNC: 23 U/L (ref 15–37)
BASOPHILS # BLD AUTO: 0.02 X10(3) UL (ref 0–0.2)
BASOPHILS NFR BLD AUTO: 0.5 %
BILIRUB SERPL-MCNC: 0.4 MG/DL (ref 0.1–2)
BUN BLD-MCNC: 12 MG/DL (ref 7–18)
CALCIUM BLD-MCNC: 9.3 MG/DL (ref 8.5–10.1)
CHLORIDE SERPL-SCNC: 108 MMOL/L (ref 98–112)
CO2 SERPL-SCNC: 27 MMOL/L (ref 21–32)
CREAT BLD-MCNC: 0.95 MG/DL
EOSINOPHIL # BLD AUTO: 0.07 X10(3) UL (ref 0–0.7)
EOSINOPHIL NFR BLD AUTO: 1.6 %
ERYTHROCYTE [DISTWIDTH] IN BLOOD BY AUTOMATED COUNT: 14.1 %
GFR SERPLBLD BASED ON 1.73 SQ M-ARVRAT: 66 ML/MIN/1.73M2 (ref 60–?)
GLOBULIN PLAS-MCNC: 4.1 G/DL (ref 2.8–4.4)
GLUCOSE BLD-MCNC: 92 MG/DL (ref 70–99)
HCT VFR BLD AUTO: 42.3 %
HGB BLD-MCNC: 14.1 G/DL
IMM GRANULOCYTES # BLD AUTO: 0.01 X10(3) UL (ref 0–1)
IMM GRANULOCYTES NFR BLD: 0.2 %
LIPASE SERPL-CCNC: 109 U/L (ref 73–393)
LYMPHOCYTES # BLD AUTO: 1.78 X10(3) UL (ref 1–4)
LYMPHOCYTES NFR BLD AUTO: 40.4 %
MCH RBC QN AUTO: 30.8 PG (ref 26–34)
MCHC RBC AUTO-ENTMCNC: 33.3 G/DL (ref 31–37)
MCV RBC AUTO: 92.4 FL
MONOCYTES # BLD AUTO: 0.55 X10(3) UL (ref 0.1–1)
MONOCYTES NFR BLD AUTO: 12.5 %
NEUTROPHILS # BLD AUTO: 1.98 X10 (3) UL (ref 1.5–7.7)
NEUTROPHILS # BLD AUTO: 1.98 X10(3) UL (ref 1.5–7.7)
NEUTROPHILS NFR BLD AUTO: 44.8 %
OSMOLALITY SERPL CALC.SUM OF ELEC: 291 MOSM/KG (ref 275–295)
PLATELET # BLD AUTO: 246 10(3)UL (ref 150–450)
POTASSIUM SERPL-SCNC: 3.8 MMOL/L (ref 3.5–5.1)
PROT SERPL-MCNC: 7.8 G/DL (ref 6.4–8.2)
RBC # BLD AUTO: 4.58 X10(6)UL
SARS-COV-2 RNA RESP QL NAA+PROBE: NOT DETECTED
SODIUM SERPL-SCNC: 141 MMOL/L (ref 136–145)
TROPONIN I HIGH SENSITIVITY: 11 NG/L
WBC # BLD AUTO: 4.4 X10(3) UL (ref 4–11)

## 2023-01-10 PROCEDURE — 36415 COLL VENOUS BLD VENIPUNCTURE: CPT

## 2023-01-10 PROCEDURE — 93010 ELECTROCARDIOGRAM REPORT: CPT

## 2023-01-10 PROCEDURE — 99214 OFFICE O/P EST MOD 30 MIN: CPT

## 2023-01-10 PROCEDURE — 93005 ELECTROCARDIOGRAM TRACING: CPT

## 2023-01-10 PROCEDURE — 84484 ASSAY OF TROPONIN QUANT: CPT

## 2023-01-10 PROCEDURE — 85025 COMPLETE CBC W/AUTO DIFF WBC: CPT | Performed by: EMERGENCY MEDICINE

## 2023-01-10 PROCEDURE — 71045 X-RAY EXAM CHEST 1 VIEW: CPT

## 2023-01-10 PROCEDURE — 84484 ASSAY OF TROPONIN QUANT: CPT | Performed by: EMERGENCY MEDICINE

## 2023-01-10 PROCEDURE — 80053 COMPREHEN METABOLIC PANEL: CPT

## 2023-01-10 PROCEDURE — 85025 COMPLETE CBC W/AUTO DIFF WBC: CPT

## 2023-01-10 PROCEDURE — 83690 ASSAY OF LIPASE: CPT | Performed by: EMERGENCY MEDICINE

## 2023-01-10 PROCEDURE — 99284 EMERGENCY DEPT VISIT MOD MDM: CPT

## 2023-01-10 PROCEDURE — 80053 COMPREHEN METABOLIC PANEL: CPT | Performed by: EMERGENCY MEDICINE

## 2023-01-10 RX ORDER — ASPIRIN 81 MG/1
324 TABLET, CHEWABLE ORAL ONCE
Status: COMPLETED | OUTPATIENT
Start: 2023-01-10 | End: 2023-01-10

## 2023-01-10 NOTE — TELEPHONE ENCOUNTER
Pt is calling stating that she is having pain in the middle of her chest for the last 2 weeks was starting to feel better but its back and would like to know what to do or take

## 2023-01-10 NOTE — TELEPHONE ENCOUNTER
C/o CP that started last week having a lot of stress this week the pain was all day yesterday pain in center of chest has pain with moving she is going to try ibuprofen today for the pain and also after work go to urgent care to be seen and if the work up is neg she will call for an appointment for next week.

## 2023-01-11 ENCOUNTER — TELEPHONE (OUTPATIENT)
Dept: FAMILY MEDICINE CLINIC | Facility: CLINIC | Age: 68
End: 2023-01-11

## 2023-01-11 VITALS
BODY MASS INDEX: 26 KG/M2 | OXYGEN SATURATION: 96 % | HEART RATE: 67 BPM | SYSTOLIC BLOOD PRESSURE: 144 MMHG | RESPIRATION RATE: 18 BRPM | TEMPERATURE: 100 F | DIASTOLIC BLOOD PRESSURE: 83 MMHG | WEIGHT: 134.06 LBS

## 2023-01-11 LAB
ATRIAL RATE: 58 BPM
ATRIAL RATE: 62 BPM
P AXIS: 63 DEGREES
P AXIS: 64 DEGREES
P-R INTERVAL: 134 MS
P-R INTERVAL: 136 MS
Q-T INTERVAL: 412 MS
Q-T INTERVAL: 416 MS
QRS DURATION: 86 MS
QRS DURATION: 86 MS
QTC CALCULATION (BEZET): 408 MS
QTC CALCULATION (BEZET): 418 MS
R AXIS: 12 DEGREES
R AXIS: 12 DEGREES
T AXIS: 2 DEGREES
T AXIS: 7 DEGREES
TROPONIN I HIGH SENSITIVITY: 12 NG/L
VENTRICULAR RATE: 58 BPM
VENTRICULAR RATE: 62 BPM

## 2023-01-11 RX ORDER — PANTOPRAZOLE SODIUM 40 MG/1
40 TABLET, DELAYED RELEASE ORAL DAILY
Qty: 30 TABLET | Refills: 0 | Status: SHIPPED | OUTPATIENT
Start: 2023-01-11 | End: 2023-02-10

## 2023-01-11 RX ORDER — NAPROXEN 500 MG/1
500 TABLET ORAL 2 TIMES DAILY PRN
Qty: 20 TABLET | Refills: 0 | Status: SHIPPED | OUTPATIENT
Start: 2023-01-11 | End: 2023-01-21

## 2023-01-11 NOTE — TELEPHONE ENCOUNTER
Called and talked to patient she is feeling better made appointment with Dr Jorge Cramer for 1/17/23 going to take tomorrow off work will need note from Dr Jorge Cramer for her work

## 2023-01-11 NOTE — ED QUICK NOTES
Pt instructed to go directly to THE [facility] ED and stay npo; pt verbalized understanding.

## 2023-01-11 NOTE — ED INITIAL ASSESSMENT (HPI)
Pt with epigastric pain intermittently x 2 weeks, yesterday pt states her heart was racing    Pt under a lot of stress    No fever/cough/sob

## 2023-01-11 NOTE — TELEPHONE ENCOUNTER
Pt is calling she was seen in ER yesterday for chest pain, they told her she needs a follow up with a doctor. Please call.

## 2023-01-11 NOTE — ED INITIAL ASSESSMENT (HPI)
67YF c/c of chest pain Pt state that she been having chest pain on and off for the last week Pt state that she came into due to increased pain

## 2023-01-11 NOTE — DISCHARGE INSTRUCTIONS
Go directly to [facility] emergency department for further evaluation.

## 2023-01-17 ENCOUNTER — OFFICE VISIT (OUTPATIENT)
Dept: FAMILY MEDICINE CLINIC | Facility: CLINIC | Age: 68
End: 2023-01-17
Payer: COMMERCIAL

## 2023-01-17 VITALS
BODY MASS INDEX: 28.2 KG/M2 | HEIGHT: 58.7 IN | SYSTOLIC BLOOD PRESSURE: 158 MMHG | OXYGEN SATURATION: 97 % | TEMPERATURE: 97 F | WEIGHT: 138 LBS | DIASTOLIC BLOOD PRESSURE: 90 MMHG | RESPIRATION RATE: 16 BRPM | HEART RATE: 80 BPM

## 2023-01-17 DIAGNOSIS — I77.1 TORTUOUS AORTA (HCC): ICD-10-CM

## 2023-01-17 DIAGNOSIS — E78.00 PURE HYPERCHOLESTEROLEMIA: ICD-10-CM

## 2023-01-17 DIAGNOSIS — E03.9 ACQUIRED HYPOTHYROIDISM: ICD-10-CM

## 2023-01-17 DIAGNOSIS — R07.9 CHEST PAIN, UNSPECIFIED TYPE: Primary | ICD-10-CM

## 2023-01-17 PROCEDURE — 3077F SYST BP >= 140 MM HG: CPT | Performed by: FAMILY MEDICINE

## 2023-01-17 PROCEDURE — 99214 OFFICE O/P EST MOD 30 MIN: CPT | Performed by: FAMILY MEDICINE

## 2023-01-17 PROCEDURE — 3008F BODY MASS INDEX DOCD: CPT | Performed by: FAMILY MEDICINE

## 2023-01-17 PROCEDURE — 3080F DIAST BP >= 90 MM HG: CPT | Performed by: FAMILY MEDICINE

## 2023-01-17 RX ORDER — TIRZEPATIDE 5 MG/.5ML
5 INJECTION, SOLUTION SUBCUTANEOUS WEEKLY
COMMUNITY
Start: 2022-12-27

## 2023-01-17 NOTE — TELEPHONE ENCOUNTER
Pt is asking for [facility] to [facility] and [facility] to [facility] as well she was seen today with Dr. Cordell Schulz

## 2023-01-18 RX ORDER — ATORVASTATIN CALCIUM 10 MG/1
10 TABLET, FILM COATED ORAL DAILY
Qty: 90 TABLET | Refills: 0 | Status: SHIPPED | OUTPATIENT
Start: 2023-01-18

## 2023-01-18 RX ORDER — LEVOTHYROXINE SODIUM 88 UG/1
TABLET ORAL
Qty: 90 TABLET | Refills: 0 | Status: SHIPPED | OUTPATIENT
Start: 2023-01-18

## 2023-02-10 ENCOUNTER — OFFICE VISIT (OUTPATIENT)
Dept: FAMILY MEDICINE CLINIC | Facility: CLINIC | Age: 68
End: 2023-02-10
Payer: COMMERCIAL

## 2023-02-10 VITALS
HEIGHT: 58.7 IN | BODY MASS INDEX: 27.38 KG/M2 | RESPIRATION RATE: 16 BRPM | DIASTOLIC BLOOD PRESSURE: 84 MMHG | WEIGHT: 134 LBS | HEART RATE: 88 BPM | SYSTOLIC BLOOD PRESSURE: 120 MMHG

## 2023-02-10 DIAGNOSIS — H61.23 BILATERAL HEARING LOSS DUE TO CERUMEN IMPACTION: Primary | ICD-10-CM

## 2023-02-10 PROCEDURE — 3074F SYST BP LT 130 MM HG: CPT | Performed by: STUDENT IN AN ORGANIZED HEALTH CARE EDUCATION/TRAINING PROGRAM

## 2023-02-10 PROCEDURE — 99213 OFFICE O/P EST LOW 20 MIN: CPT | Performed by: STUDENT IN AN ORGANIZED HEALTH CARE EDUCATION/TRAINING PROGRAM

## 2023-02-10 PROCEDURE — 3008F BODY MASS INDEX DOCD: CPT | Performed by: STUDENT IN AN ORGANIZED HEALTH CARE EDUCATION/TRAINING PROGRAM

## 2023-02-10 PROCEDURE — 69210 REMOVE IMPACTED EAR WAX UNI: CPT | Performed by: STUDENT IN AN ORGANIZED HEALTH CARE EDUCATION/TRAINING PROGRAM

## 2023-02-10 PROCEDURE — 3079F DIAST BP 80-89 MM HG: CPT | Performed by: STUDENT IN AN ORGANIZED HEALTH CARE EDUCATION/TRAINING PROGRAM

## 2023-02-20 RX ORDER — ATORVASTATIN CALCIUM 10 MG/1
TABLET, FILM COATED ORAL
Qty: 90 TABLET | Refills: 0 | OUTPATIENT
Start: 2023-02-20

## 2023-03-29 ENCOUNTER — TELEPHONE (OUTPATIENT)
Dept: FAMILY MEDICINE CLINIC | Facility: CLINIC | Age: 68
End: 2023-03-29

## 2023-04-19 DIAGNOSIS — E03.9 ACQUIRED HYPOTHYROIDISM: ICD-10-CM

## 2023-04-19 DIAGNOSIS — E78.00 PURE HYPERCHOLESTEROLEMIA: ICD-10-CM

## 2023-04-21 RX ORDER — LEVOTHYROXINE SODIUM 88 UG/1
TABLET ORAL
Qty: 90 TABLET | Refills: 0 | Status: SHIPPED | OUTPATIENT
Start: 2023-04-21

## 2023-04-21 RX ORDER — ATORVASTATIN CALCIUM 10 MG/1
TABLET, FILM COATED ORAL
Qty: 90 TABLET | Refills: 0 | Status: SHIPPED | OUTPATIENT
Start: 2023-04-21

## 2023-04-21 NOTE — TELEPHONE ENCOUNTER
Tried calling home number. Unable to leave voicemail - memory is full. Attempted cell and phone continuously leaves without voicemail. Will handy again at a different time.

## 2023-08-01 ENCOUNTER — OFFICE VISIT (OUTPATIENT)
Dept: FAMILY MEDICINE CLINIC | Facility: CLINIC | Age: 68
End: 2023-08-01
Payer: MEDICAID

## 2023-08-01 VITALS
OXYGEN SATURATION: 96 % | BODY MASS INDEX: 23.73 KG/M2 | WEIGHT: 116.13 LBS | HEIGHT: 58.7 IN | SYSTOLIC BLOOD PRESSURE: 130 MMHG | DIASTOLIC BLOOD PRESSURE: 90 MMHG | HEART RATE: 73 BPM

## 2023-08-01 DIAGNOSIS — R42 ORTHOSTATIC DIZZINESS: Primary | ICD-10-CM

## 2023-08-01 PROCEDURE — 99213 OFFICE O/P EST LOW 20 MIN: CPT | Performed by: FAMILY MEDICINE

## 2023-08-01 RX ORDER — NETARSUDIL 0.2 MG/ML
SOLUTION/ DROPS OPHTHALMIC; TOPICAL
COMMUNITY
Start: 2023-06-01

## 2023-08-01 RX ORDER — BIMATOPROST 0.1 MG/ML
SOLUTION/ DROPS OPHTHALMIC
COMMUNITY
Start: 2023-07-25

## 2023-09-06 ENCOUNTER — TELEPHONE (OUTPATIENT)
Dept: FAMILY MEDICINE CLINIC | Facility: CLINIC | Age: 68
End: 2023-09-06

## 2023-09-06 NOTE — TELEPHONE ENCOUNTER
Pt was stung by a bee, she was stung in the knee and her calf is swollen, she was using ice packs and taking benadryl. No shortness of breath.

## 2023-09-06 NOTE — TELEPHONE ENCOUNTER
Was stung by bee on Monday on left leg by front of knee area is swollen she has been taking benadryl but the calf is swollen and has pain in the area. I told her to go to the [facility] to be seen for this to make sure she does not have an infection.

## 2023-11-20 ENCOUNTER — TELEPHONE (OUTPATIENT)
Dept: FAMILY MEDICINE CLINIC | Facility: CLINIC | Age: 68
End: 2023-11-20

## 2023-11-20 NOTE — TELEPHONE ENCOUNTER
LM for patient to see if she has a new insurance possibly Medicare or [place] because [employer] is no longer valid.

## 2024-05-08 ENCOUNTER — OFFICE VISIT (OUTPATIENT)
Dept: FAMILY MEDICINE CLINIC | Facility: CLINIC | Age: 69
End: 2024-05-08
Payer: MEDICARE

## 2024-05-08 VITALS
DIASTOLIC BLOOD PRESSURE: 64 MMHG | SYSTOLIC BLOOD PRESSURE: 122 MMHG | RESPIRATION RATE: 16 BRPM | HEART RATE: 90 BPM | BODY MASS INDEX: 28.13 KG/M2 | WEIGHT: 134 LBS | HEIGHT: 58 IN | TEMPERATURE: 97 F

## 2024-05-08 DIAGNOSIS — N75.0 BARTHOLIN'S DUCT CYST: ICD-10-CM

## 2024-05-08 DIAGNOSIS — Z00.00 LABORATORY EXAM ORDERED AS PART OF ROUTINE GENERAL MEDICAL EXAMINATION: ICD-10-CM

## 2024-05-08 DIAGNOSIS — Z00.00 ENCOUNTER FOR ANNUAL HEALTH EXAMINATION: Primary | ICD-10-CM

## 2024-05-08 DIAGNOSIS — Z12.31 BREAST CANCER SCREENING BY MAMMOGRAM: ICD-10-CM

## 2024-05-08 DIAGNOSIS — E03.9 ACQUIRED HYPOTHYROIDISM: ICD-10-CM

## 2024-05-08 DIAGNOSIS — Z12.11 COLON CANCER SCREENING: ICD-10-CM

## 2024-05-08 PROCEDURE — 96160 PT-FOCUSED HLTH RISK ASSMT: CPT | Performed by: FAMILY MEDICINE

## 2024-05-08 PROCEDURE — G0439 PPPS, SUBSEQ VISIT: HCPCS | Performed by: FAMILY MEDICINE

## 2024-05-08 NOTE — PROGRESS NOTES
Subjective:   Latonia Fowler is a 69 year old female who presents for a MA (Medicare Advantage) Supervisit (Once per calendar year) and scheduled follow up of multiple significant but stable problems.     Last pap: no indication.  Over 66yo.   Last mammogram: 10/2018.    Colonoscopy: overdue.    Immunizations: reviewed  Menses: menopause..  Dental: q6 months    Thyroid - not on the medications.  Has been off the med for 2 yrs.      Weight - has been working with the weigh loss clinic.  Issues with getting coverage for mounjaro.  Dr. Valera started her on metformin.  Went from 119lbs to 130s in a matter of a few weeks.    She is hoping to get the medicine covered here shortly after insurance change.      Occ: at home with  - advanced dementia. .   : yes.  Exercise: pilates.  Going back to yoga as well.  Meditation.  Dance class    Diet: healthy, balance.d       History/Other:   Fall Risk Assessment:   She has been screened for Falls and is High Risk. Fall Prevention information provided to patient in After Visit Summary.    Do you feel unsteady when standing or walking?: Yes  Do you worry about falling?: No  Have you fallen in the past year?: No     Cognitive Assessment:   She had a completely normal cognitive assessment - see flowsheet entries     Functional Ability/Status:   Latonia Fowler has some abnormal functions as listed below:  She has Driving difficulties based on screening of functional status.       Depression Screening (PHQ-2/PHQ-9): PHQ-2 SCORE: 0  , done 5/8/2024             Advanced Directives:   She does have a Living Will but we do NOT have it on file in Epic.    She does NOT have a Power of  for Health Care. [Do you have a healthcare power of ?: No]  Discussed Advance Care Planning with patient (and family/surrogate if present). Standard forms made available to patient in After Visit Summary.      Patient Active Problem List   Diagnosis    Unspecified glaucoma(365.9)     Encounter for therapeutic drug monitoring    Abnormal EKG    Weight gain    Obesity (BMI 30.0-34.9)    Stress    Pure hypercholesterolemia    Acquired hypothyroidism    Dysthymia     Allergies:  She has No Known Allergies.    Current Medications:  Outpatient Medications Marked as Taking for the 5/8/24 encounter (Office Visit) with Isak Mantilla MD   Medication Sig    RHOPRESSA 0.02 % Ophthalmic Solution INSTILL 1 DROPS INTO AFFECTED EYE(S) AT BEDTIME    LUMIGAN 0.01 % Ophthalmic Solution INSTILL 1 DROP INTO AFFECTED EYE(S) AT BEDTIME    Insulin Pen Needle (BD PEN NEEDLE MINI U/F) 31G X 5 MM Does not apply Misc Use daily as directed with Victoza    Insulin Pen Needle (NOVOFINE) 32G X 6 MM Does not apply Misc Use as directed with Victoza    Timolol Maleate 0.5 % Ophthalmic Gel Forming Solution Place 1 drop into both eyes daily.    Multiple Vitamins-Minerals (MULTI + OMEGA-3 ADULT GUMMIES OR) Take by mouth.       Medical History:  She  has a past medical history of Abdominal or pelvic swelling, mass or lump, unspecified site, Cervicalgia, Cystitis, unspecified, Depression, Glaucoma, Herpes zoster without mention of complication, Hyperlipidemia, and Hypothyroidism.  Surgical History:  She  has a past surgical history that includes biopsy of thyroid,tamy (2002) and other surgical history (1975).   Family History:  Her family history includes Diabetes in her father; High Cholesterol in her mother; Hypertension in her mother.  Social History:  She  reports that she has never smoked. She has never used smokeless tobacco. She reports that she does not currently use alcohol. She reports that she does not use drugs.    Tobacco:  She has never smoked tobacco.    CAGE Alcohol Screen:   CAGE screening score of 0 on 5/8/2024, showing low risk of alcohol abuse.      Patient Care Team:  Isak Mantilla MD as PCP - General (Family Medicine)  Jordan Oconnor MD as Consulting Physician (Weight Loss Managment)  Hood Maher RD  (Dietitian)  Frankel, Jennifer, MD (GASTROENTEROLOGY)    Review of Systems  Constitutional: negative  Eyes: negative  Ears, nose, mouth, throat, and face: negative  Respiratory: negative  Cardiovascular: negative  Gastrointestinal: negative  Genitourinary: lump in lateral vaginal wall.  Neurological: negative      Objective:   Physical Exam  General appearance: alert, appears stated age and cooperative  Eyes: conjunctivae/corneas clear. PERRL, EOM's intact.   Ears: normal TM's and external ear canals both ears  Neck: no adenopathy, no JVD, supple, symmetrical, trachea midline and thyroid not enlarged, symmetric, no tenderness/mass/nodules  Lungs: clear to auscultation bilaterally  Heart: S1, S2 normal, no murmur, click, rub or gallop, regular rate and rhythm  Abdomen: soft, non-tender; bowel sounds normal; no masses,  no organomegaly   - normal external genitalia.  No cyst on labia, but she reports where it was previously.  In the region of a bartholin gland cyst.   Extremities: extremities normal, atraumatic, no cyanosis or edema  Pulses: 2+ and symmetric  Neurologic: Alert and oriented X 3, normal strength and tone. Normal symmetric reflexes. Normal coordination and gait     /64   Pulse 90   Temp 97 °F (36.1 °C)   Resp 16   Ht 4' 10\" (1.473 m)   Wt 134 lb (60.8 kg)   BMI 28.01 kg/m²  Estimated body mass index is 28.01 kg/m² as calculated from the following:    Height as of this encounter: 4' 10\" (1.473 m).    Weight as of this encounter: 134 lb (60.8 kg).    Medicare Hearing Assessment:   Hearing Screening    Screening Method: Finger Rub         Visual Acuity:   Right Eye Visual Acuity: Uncorrected Right Eye Chart Acuity: 20/25   Left Eye Visual Acuity: Uncorrected Left Eye Chart Acuity: 20/25   Both Eyes Visual Acuity: Uncorrected Both Eyes Chart Acuity: 20/25   Able To Tolerate Visual Acuity: Yes        Assessment & Plan:   Latonia Fowler was seen in the office today:  had concerns including  Physical (MAWV/ashely).    1. Encounter for annual health examination  Overall well  Doing more for her mental, spiritual health  Working with weigh loss team  Labs overdue  Mammogram overdue  Colon screening overdue.     2. Acquired hypothyroidism  Off meds 2 yrs  Need to see if TSH shows euthyroid or if meds need to be restarted.   - TSH W Reflex To Free T4    3. Laboratory exam ordered as part of routine general medical examination  Routine labs.   - CBC With Differential With Platelet  - TSH W Reflex To Free T4  - Hemoglobin A1C  - Lipid Panel    4. Bartholin's duct cyst  Referral to OBGYN  - OBG Referral - In Network    5. Colon cancer screening  FIT testing ordered  - INSURE FECAL GLOBIN by IMMUNOASSAY [[id number]] [Q]    6. Breast cancer screening by mammogram  Routine mammogram.   - [facility] HONG 2D+3D SCREENING BILAT (CPT=77067/78614); Future          The patient indicates understanding of these issues and agrees to the plan.  Reinforced healthy diet, lifestyle, and exercise.      No follow-ups on file.     Isak Mantilla MD, 5/8/2024     Supplementary Documentation:   General Health:  In the past six months, have you lost more than 10 pounds without trying?: 2 - No  Has your appetite been poor?: No  Type of Diet: Balanced  How does the patient maintain a good energy level?: Appropriate Exercise;Daily Walks;Stretching  How would you describe your daily physical activity?: Heavy  How would you describe your current health state?: Good  How do you maintain positive mental well-being?: Visiting Friends  On a scale of 0 to 10, with 0 being no pain and 10 being severe pain, what is your pain level?: 0 - (None)  In the past six months, have you experienced urine leakage?: 0-No  At any time do you feel concerned for the safety/well-being of yourself and/or your children, in your home or elsewhere?: No  Have you had any immunizations at another office such as Influenza, Hepatitis B, Tetanus, or Pneumococcal?: No        Latonia Fowler's SCREENING SCHEDULE   Tests on this list are recommended by your physician but may not be covered, or covered at this frequency, by your insurer.   Please check with your insurance carrier before scheduling to verify coverage.   PREVENTATIVE SERVICES FREQUENCY &  COVERAGE DETAILS LAST COMPLETION DATE   Diabetes Screening    Fasting Blood Sugar /  Glucose    One screening every 12 months if never tested or if previously tested but not diagnosed with pre-diabetes   One screening every 6 months if diagnosed with pre-diabetes Lab Results   Component Value Date    GLU 92 01/10/2023        Cardiovascular Disease Screening    Lipid Panel  Cholesterol  Lipoprotein (HDL)  Triglycerides Covered every 5 years for all Medicare beneficiaries without apparent signs or symptoms of cardiovascular disease Lab Results   Component Value Date    CHOLEST 249 (H) 09/30/2020    HDL 83 (H) 09/30/2020     (H) 09/30/2020    TRIG 94 09/30/2020         Electrocardiogram (EKG)   Covered if needed at Welcome to Medicare, and non-screening if indicated for medical reasons 01/11/2023      Ultrasound Screening for Abdominal Aortic Aneurysm (AAA) Covered once in a lifetime for one of the following risk factors    Men who are 65-75 years old and have ever smoked    Anyone with a family history -     Colorectal Cancer Screening  Covered for ages 50-85; only need ONE of the following:    Colonoscopy   Covered every 10 years    Covered every 2 years if patient is at high risk or previous colonoscopy was abnormal -    Health Maintenance   Topic Date Due    Colorectal Cancer Screening  Never done       Flexible Sigmoidoscopy   Covered every 4 years -    Fecal Occult Blood Test Covered annually -   Bone Density Screening    Bone density screening    Covered every 2 years after age 65 if diagnosed with risk of osteoporosis or estrogen deficiency.    Covered yearly for long-term glucocorticoid medication use (Steroids) No results  found for this or any previous visit.      Health Maintenance Due   Topic Date Due    DEXA Scan  Never done      Pap and Pelvic    Pap   Covered every 2 years for women at normal risk; Annually if at high risk 02/09/2021  No recommendations at this time    Chlamydia Annually if high risk -  No recommendations at this time   Screening Mammogram    Mammogram     Recommend annually for all female patients aged 40 and older    One baseline mammogram covered for patients aged 35-39 -    Health Maintenance   Topic Date Due    Mammogram  10/26/2019       Immunizations    Influenza Covered once per flu season  Please get every year -  No recommendations at this time    Pneumococcal Each vaccine (Kqeebos91 & Hhhbacios42) covered once after 65 Prevnar 13: -    [id number]: -     Pneumococcal Vaccination(1 of 1 - PCV) Never done    Hepatitis B One screening covered for patients with certain risk factors   -  No recommendations at this time    Tetanus Toxoid Not covered by Medicare Part B unless medically necessary (cut with metal); may be covered with your pharmacy prescription benefits -    Tetanus, Diptheria and Pertusis TD and TDaP Not covered by Medicare Part B -  No recommendations at this time    Zoster Not covered by Medicare Part B; may be covered with your pharmacy  prescription benefits -  Zoster Vaccines(1 of 2) Never done

## 2024-05-08 NOTE — PATIENT INSTRUCTIONS
Latonia Fowler's SCREENING SCHEDULE   Tests on this list are recommended by your physician but may not be covered, or covered at this frequency, by your insurer.   Please check with your insurance carrier before scheduling to verify coverage.   PREVENTATIVE SERVICES FREQUENCY &  COVERAGE DETAILS LAST COMPLETION DATE   Diabetes Screening    Fasting Blood Sugar /  Glucose    One screening every 12 months if never tested or if previously tested but not diagnosed with pre-diabetes   One screening every 6 months if diagnosed with pre-diabetes Lab Results   Component Value Date    GLU 92 01/10/2023        Cardiovascular Disease Screening    Lipid Panel  Cholesterol  Lipoprotein (HDL)  Triglycerides Covered every 5 years for all Medicare beneficiaries without apparent signs or symptoms of cardiovascular disease Lab Results   Component Value Date    CHOLEST 249 (H) 09/30/2020    HDL 83 (H) 09/30/2020     (H) 09/30/2020    TRIG 94 09/30/2020         Electrocardiogram (EKG)   Covered if needed at Welcome to Medicare, and non-screening if indicated for medical reasons 01/11/2023      Ultrasound Screening for Abdominal Aortic Aneurysm (AAA) Covered once in a lifetime for one of the following risk factors   • Men who are 65-75 years old and have ever smoked   • Anyone with a family history -     Colorectal Cancer Screening  Covered for ages 50-85; only need ONE of the following:    Colonoscopy   Covered every 10 years    Covered every 2 years if patient is at high risk or previous colonoscopy was abnormal -    Health Maintenance   Topic Date Due   • Colorectal Cancer Screening  Never done       Flexible Sigmoidoscopy   Covered every 4 years -    Fecal Occult Blood Test Covered annually -   Bone Density Screening    Bone density screening    Covered every 2 years after age 65 if diagnosed with risk of osteoporosis or estrogen deficiency.    Covered yearly for long-term glucocorticoid medication use (Steroids) No results  found for this or any previous visit.      Health Maintenance Due   Topic Date Due   • DEXA Scan  Never done      Pap and Pelvic    Pap   Covered every 2 years for women at normal risk; Annually if at high risk 02/09/2021  No recommendations at this time    Chlamydia Annually if high risk -  No recommendations at this time   Screening Mammogram    Mammogram     Recommend annually for all female patients aged 40 and older    One baseline mammogram covered for patients aged 35-39 -    Health Maintenance   Topic Date Due   • Mammogram  10/26/2019       Immunizations    Influenza Covered once per flu season  Please get every year -  No recommendations at this time    Pneumococcal Each vaccine (Bcdyyer70 & Iqwqbqarl66) covered once after 65 Prevnar 13: -    [id number]: -     Pneumococcal Vaccination(1 of 1 - PCV) Never done    Hepatitis B One screening covered for patients with certain risk factors   -  No recommendations at this time    Tetanus Toxoid Not covered by Medicare Part B unless medically necessary (cut with metal); may be covered with your pharmacy prescription benefits -    Tetanus, Diptheria and Pertusis TD and TDaP Not covered by Medicare Part B -  No recommendations at this time    Zoster Not covered by Medicare Part B; may be covered with your pharmacy  prescription benefits -  Zoster Vaccines(1 of 2) Never done

## 2024-05-09 ENCOUNTER — TELEPHONE (OUTPATIENT)
Dept: FAMILY MEDICINE CLINIC | Facility: CLINIC | Age: 69
End: 2024-05-09

## 2024-05-09 DIAGNOSIS — E04.2 MULTIPLE THYROID NODULES: Primary | ICD-10-CM

## 2024-05-09 NOTE — TELEPHONE ENCOUNTER
Patient wanted to know if Dr. Mantilla could put in an order to check her nodules on her thyroid that was last order in 2018 by Dr. Contreras.

## 2024-05-14 ENCOUNTER — LAB ENCOUNTER (OUTPATIENT)
Dept: LAB | Age: 69
End: 2024-05-14
Attending: FAMILY MEDICINE

## 2024-05-14 LAB
BASOPHILS # BLD AUTO: 0.01 X10(3) UL (ref 0–0.2)
BASOPHILS NFR BLD AUTO: 0.3 %
CHOLEST SERPL-MCNC: 297 MG/DL (ref ?–200)
EOSINOPHIL # BLD AUTO: 0.02 X10(3) UL (ref 0–0.7)
EOSINOPHIL NFR BLD AUTO: 0.5 %
ERYTHROCYTE [DISTWIDTH] IN BLOOD BY AUTOMATED COUNT: 14.7 %
EST. AVERAGE GLUCOSE BLD GHB EST-MCNC: 117 MG/DL (ref 68–126)
FASTING PATIENT LIPID ANSWER: YES
HBA1C MFR BLD: 5.7 % (ref ?–5.7)
HCT VFR BLD AUTO: 44.3 %
HDLC SERPL-MCNC: 122 MG/DL (ref 40–59)
HGB BLD-MCNC: 14.1 G/DL
IMM GRANULOCYTES # BLD AUTO: 0.01 X10(3) UL (ref 0–1)
IMM GRANULOCYTES NFR BLD: 0.3 %
LDLC SERPL CALC-MCNC: 163 MG/DL (ref ?–100)
LYMPHOCYTES # BLD AUTO: 1.74 X10(3) UL (ref 1–4)
LYMPHOCYTES NFR BLD AUTO: 45.9 %
MCH RBC QN AUTO: 30.3 PG (ref 26–34)
MCHC RBC AUTO-ENTMCNC: 31.8 G/DL (ref 31–37)
MCV RBC AUTO: 95.3 FL
MONOCYTES # BLD AUTO: 0.39 X10(3) UL (ref 0.1–1)
MONOCYTES NFR BLD AUTO: 10.3 %
NEUTROPHILS # BLD AUTO: 1.62 X10 (3) UL (ref 1.5–7.7)
NEUTROPHILS # BLD AUTO: 1.62 X10(3) UL (ref 1.5–7.7)
NEUTROPHILS NFR BLD AUTO: 42.7 %
NONHDLC SERPL-MCNC: 175 MG/DL (ref ?–130)
PLATELET # BLD AUTO: 236 10(3)UL (ref 150–450)
RBC # BLD AUTO: 4.65 X10(6)UL
TRIGL SERPL-MCNC: 78 MG/DL (ref 30–149)
TSI SER-ACNC: 1.6 MIU/ML (ref 0.36–3.74)
VLDLC SERPL CALC-MCNC: 15 MG/DL (ref 0–30)
WBC # BLD AUTO: 3.8 X10(3) UL (ref 4–11)

## 2024-05-14 PROCEDURE — 85025 COMPLETE CBC W/AUTO DIFF WBC: CPT | Performed by: FAMILY MEDICINE

## 2024-05-14 PROCEDURE — 80061 LIPID PANEL: CPT | Performed by: FAMILY MEDICINE

## 2024-05-14 PROCEDURE — 84443 ASSAY THYROID STIM HORMONE: CPT | Performed by: FAMILY MEDICINE

## 2024-05-14 PROCEDURE — 36415 COLL VENOUS BLD VENIPUNCTURE: CPT | Performed by: FAMILY MEDICINE

## 2024-05-14 PROCEDURE — 83036 HEMOGLOBIN GLYCOSYLATED A1C: CPT | Performed by: FAMILY MEDICINE

## 2024-05-30 ENCOUNTER — HOSPITAL ENCOUNTER (OUTPATIENT)
Dept: MAMMOGRAPHY | Age: 69
Discharge: HOME | End: 2024-05-30
Attending: FAMILY MEDICINE
Payer: MEDICARE

## 2024-05-30 DIAGNOSIS — Z12.31 BREAST CANCER SCREENING BY MAMMOGRAM: ICD-10-CM

## 2024-05-30 PROCEDURE — 77067 SCR MAMMO BI INCL CAD: CPT | Performed by: FAMILY MEDICINE

## 2024-05-30 PROCEDURE — 77063 BREAST TOMOSYNTHESIS BI: CPT | Performed by: FAMILY MEDICINE

## 2024-06-13 ENCOUNTER — HOSPITAL ENCOUNTER (OUTPATIENT)
Dept: ULTRASOUND IMAGING | Age: 69
Discharge: HOME | End: 2024-06-13
Attending: FAMILY MEDICINE
Payer: MEDICARE

## 2024-06-13 DIAGNOSIS — E04.2 MULTIPLE THYROID NODULES: ICD-10-CM

## 2024-06-13 PROCEDURE — 76536 US EXAM OF HEAD AND NECK: CPT | Performed by: FAMILY MEDICINE

## 2024-06-17 ENCOUNTER — TELEPHONE (OUTPATIENT)
Dept: FAMILY MEDICINE CLINIC | Facility: CLINIC | Age: 69
End: 2024-06-17

## 2024-06-18 NOTE — TELEPHONE ENCOUNTER
Dr Mantilla said: Cholesterol elevated.  Most of this is still the \"good\" HDL cholesterol, but the bad LDL is creeping up over time  A1C just slightly in the pre-diabetic range at 5.7  Blood counts stable  Thyroid looks good  Called [facility] to call back to go over labs

## 2025-06-03 ENCOUNTER — TELEPHONE (OUTPATIENT)
Dept: FAMILY MEDICINE CLINIC | Facility: CLINIC | Age: 70
End: 2025-06-03

## 2025-06-03 NOTE — TELEPHONE ENCOUNTER
Tried to call patient multiple times to cancel todays appointment home phone can't leave a message and cell phone goes right to voicemail left two voicemails tried to call the daughter but goes to voicemail

## 2025-06-24 ENCOUNTER — PATIENT OUTREACH (OUTPATIENT)
Dept: FAMILY MEDICINE CLINIC | Facility: CLINIC | Age: 70
End: 2025-06-24

## 2025-06-30 RX ORDER — PROPRANOLOL HYDROCHLORIDE 10 MG/1
10 TABLET ORAL 2 TIMES DAILY PRN
Qty: 60 TABLET | Refills: 0 | Status: SHIPPED | OUTPATIENT
Start: 2025-06-30

## 2025-06-30 NOTE — TELEPHONE ENCOUNTER
Please review; protocol failed/ has no protocol        Please see message below for upcoming appointment.    Future Appointments   Date Time Provider Department Center   7/3/2025  9:30 AM Justice Messina NP [facility]

## 2025-07-31 ENCOUNTER — TELEPHONE (OUTPATIENT)
Dept: FAMILY MEDICINE CLINIC | Facility: CLINIC | Age: 70
End: 2025-07-31

## 2025-08-01 ENCOUNTER — OFFICE VISIT (OUTPATIENT)
Dept: FAMILY MEDICINE CLINIC | Facility: CLINIC | Age: 70
End: 2025-08-01

## 2025-08-01 VITALS
DIASTOLIC BLOOD PRESSURE: 87 MMHG | BODY MASS INDEX: 24.8 KG/M2 | OXYGEN SATURATION: 96 % | TEMPERATURE: 97 F | WEIGHT: 123 LBS | HEART RATE: 75 BPM | SYSTOLIC BLOOD PRESSURE: 135 MMHG | RESPIRATION RATE: 18 BRPM | HEIGHT: 59 IN

## 2025-08-01 DIAGNOSIS — N30.00 ACUTE CYSTITIS WITHOUT HEMATURIA: Primary | ICD-10-CM

## 2025-08-01 DIAGNOSIS — R39.9 UTI SYMPTOMS: ICD-10-CM

## 2025-08-01 LAB
APPEARANCE: CLEAR
BILIRUBIN: NEGATIVE
GLUCOSE (URINE DIPSTICK): NEGATIVE MG/DL
KETONES (URINE DIPSTICK): NEGATIVE MG/DL
MULTISTIX LOT#: ABNORMAL NUMERIC
NITRITE, URINE: NEGATIVE
PH, URINE: 5.5 (ref 4.5–8)
PROTEIN (URINE DIPSTICK): 30 MG/DL
SPECIFIC GRAVITY: 1.02 (ref 1–1.03)
URINE-COLOR: YELLOW
UROBILINOGEN,SEMI-QN: 0.2 MG/DL (ref 0–1.9)

## 2025-08-01 PROCEDURE — 87077 CULTURE AEROBIC IDENTIFY: CPT | Performed by: NURSE PRACTITIONER

## 2025-08-01 PROCEDURE — 87086 URINE CULTURE/COLONY COUNT: CPT | Performed by: NURSE PRACTITIONER

## 2025-08-01 PROCEDURE — 81003 URINALYSIS AUTO W/O SCOPE: CPT | Performed by: NURSE PRACTITIONER

## 2025-08-01 PROCEDURE — 87186 SC STD MICRODIL/AGAR DIL: CPT | Performed by: NURSE PRACTITIONER

## 2025-08-01 PROCEDURE — 99213 OFFICE O/P EST LOW 20 MIN: CPT | Performed by: NURSE PRACTITIONER

## 2025-08-01 PROCEDURE — 1160F RVW MEDS BY RX/DR IN RCRD: CPT | Performed by: NURSE PRACTITIONER

## 2025-08-01 PROCEDURE — 1159F MED LIST DOCD IN RCRD: CPT | Performed by: NURSE PRACTITIONER

## 2025-08-01 RX ORDER — CEPHALEXIN 500 MG/1
500 CAPSULE ORAL 2 TIMES DAILY
Qty: 14 CAPSULE | Refills: 0 | Status: SHIPPED | OUTPATIENT
Start: 2025-08-01 | End: 2025-08-08

## (undated) DIAGNOSIS — Z12.31 VISIT FOR SCREENING MAMMOGRAM: Primary | ICD-10-CM

## (undated) NOTE — LETTER
December 19, 2019          [address] [place] [phone]          Dear Paula Barrios: The results from your recent tests ordered by [id number] are normal.  Please review the list of test results.   Your result is the number o

## (undated) NOTE — MR AVS SNAPSHOT
[address] [phone]               Thank you for choosing us for your health care visit with Beth Ruvalcaba MD.  We are glad to serve you and happy to provide you with this summary of Oseltamivir Phosphate 75 MG Caps   Take 1 capsule (75 mg total) by mouth 2 (two) times daily. Commonly known as:  TAMIFLU           prednisoLONE acetate 1 % Susp   Commonly known as:  PRED FORTE           TYLENOL OR   as needed.            Vitamin D3 200

## (undated) NOTE — LETTER
19    Patient: Milagros Friedman  : 1955 Visit date: 2019    Dear  Dr. Penelope Yeager MD,    Thank you for referring Milagros Friedman to my practice. Please find my assessment and plan below.              History of Present Illness     Ericksonluis CormierValarie is a 7

## (undated) NOTE — Clinical Note
Hi, Dr. Alex Rogers and Cathy Mercado! Thank you for referring Ms. Debra Thapa for rheumatologic evaluation. Please see the discussion portion of my note and let me know if you have any questions.      [facility], DO  [facility]  3/9/2021

## (undated) NOTE — LETTER
Date: 1/11/2023    Patient Name: Ludwin Yepez          To Whom it may concern: This letter has been written at the patient's request. The above patient was seen at the [facility] Emergency room for treatment of a medical condition. This patient should be excused from attending work/school from 1/102023 through 1/12/2023. The patient may return to work/school on 1/13/2023 with the following limitations: none.         Sincerely,    Ondina Husain M.D.   [facility]  01/11/23

## (undated) NOTE — MR AVS SNAPSHOT
[address] [phone]               Thank you for choosing us for your health care visit with Aleksander Benedict MD.  We are glad to serve you and happy to provide you with this summary of schedule your appointment. Failure to obtain required authorization numbers can create reimbursement difficulties for you.       Assoc Dx:  Stress [F43.9]          Reason for Today's Visit     Weight Check           Medical Issues Discussed Today     Encoun your doctor or other care provider to review them with you.          Where to Get Your Medications      These medications were sent to [address] [place] [address] [address], [address] [facility]  [address] [phone], [phone]  [address], Liana Mota [phone]

## (undated) NOTE — LETTER
April 30, 2018          [address] [place] [phone]          Dear Priscilla Check: The following are the results of your recent tests [facility] [address] [facility] [facility] [address] [facility]. Please review the list of test results.   Your result is the v

## (undated) NOTE — LETTER
[address] [facility], [address] [facility], [address] [facility], [facility] [address] [employer] 121     4/22/2021      [address] [place] [phone]    2/4/1955      Dear Nanda Galloway,    Breast cancer is t

## (undated) NOTE — MR AVS SNAPSHOT
[address]  [facility], [address]  [address] [place] [phone]               Thank you for choosing us for your health care visit with Kavitha Clark PA-C.   We are glad to serve you and happy to provide you with this Levothyroxine Sodium 88 MCG Tabs   Take 1 tablet (88 mcg total) by mouth before breakfast.   Commonly known as:  SYNTHROID, LEVOTHROID           Lorcaserin HCl 10 MG Tabs   Take 10 mg by mouth 2 (two) times daily.    Commonly known as:  BELVIQ           MU

## (undated) NOTE — Clinical Note
[facility], [address] [place] [address], [address]       03/14/2017        Patient: Suzette Mari   YOB: 1955   Date of Visit: 3/14/2017       Dear  Dr. Maria Alejandra Lindsay, N,      Thank you for referring [employer] to

## (undated) NOTE — MR AVS SNAPSHOT
[address]  [phone]               Thank you for choosing us for your health care visit with Kristin Dueñas.  Farooq Eng MD.  We are glad to serve you and happy to provide you with this summar Levothyroxine Sodium 88 MCG Tabs   Take 1 tablet (88 mcg total) by mouth before breakfast.   Commonly known as:  SYNTHROID, LEVOTHROID           Lorcaserin HCl 10 MG Tabs   Take 10 mg by mouth 2 (two) times daily.    Commonly known as:  BELVIQ           MU

## (undated) NOTE — LETTER
Date & Time: 7/25/2020, 6:39 PM  Patient: Susana Ricketts  Encounter Provider(s):    Nidia Ferrari MD       To Whom It May Concern:    Rosie Hinds was seen and treated in our department on 7/25/2020. She should not return to work until July 29.     If

## (undated) NOTE — MR AVS SNAPSHOT
[facility] [place]  [place],  [address] [place] [address] [phone]               Thank you for choosing us for your health care visit with Tammy Ortega DO.   We are glad to serve you and happy to provide you with this sum GARCINIA CAMBOGIA-CHROMIUM OR   Take  by mouth.           latanoprost 0.005 % Soln   nightly.    Commonly known as:  XALATAN           Levothyroxine Sodium 88 MCG Tabs   Take 1 tablet (88 mcg total) by mouth before breakfast.   Commonly known as:  SYNTHROI active are less likely to develop some chronic diseases than adults who are inactive.      HOW TO GET STARTED: HOW TO STAY MOTIVATED:   Start activities slowly and build up over time Do what you like   Get your heart pumping – brisk walking, biking, swimmin